# Patient Record
Sex: MALE | Race: WHITE | NOT HISPANIC OR LATINO | Employment: FULL TIME | URBAN - METROPOLITAN AREA
[De-identification: names, ages, dates, MRNs, and addresses within clinical notes are randomized per-mention and may not be internally consistent; named-entity substitution may affect disease eponyms.]

---

## 2015-01-14 LAB
EXTERNAL HIV SCREEN: NORMAL
HCV AB SER-ACNC: NEGATIVE

## 2020-12-08 ENCOUNTER — TELEPHONE (OUTPATIENT)
Dept: FAMILY MEDICINE CLINIC | Facility: CLINIC | Age: 45
End: 2020-12-08

## 2021-01-03 ENCOUNTER — HOSPITAL ENCOUNTER (EMERGENCY)
Facility: HOSPITAL | Age: 46
Discharge: HOME | End: 2021-01-03
Attending: EMERGENCY MEDICINE
Payer: COMMERCIAL

## 2021-01-03 VITALS
RESPIRATION RATE: 20 BRPM | SYSTOLIC BLOOD PRESSURE: 136 MMHG | TEMPERATURE: 97.7 F | DIASTOLIC BLOOD PRESSURE: 91 MMHG | WEIGHT: 230 LBS | HEART RATE: 78 BPM | OXYGEN SATURATION: 97 %

## 2021-01-03 DIAGNOSIS — N30.91 HEMORRHAGIC CYSTITIS: Primary | ICD-10-CM

## 2021-01-03 LAB
AMORPH URATE CRY URNS QL MICRO: ABNORMAL /HPF
ANION GAP SERPL CALCULATED.3IONS-SCNC: 9 MMOL/L (ref 4–13)
BACTERIA UR QL AUTO: ABNORMAL /HPF
BASOPHILS # BLD AUTO: 0.02 THOUSANDS/ΜL (ref 0–0.1)
BASOPHILS NFR BLD AUTO: 0 % (ref 0–1)
BILIRUB UR QL STRIP: ABNORMAL
BUN SERPL-MCNC: 16 MG/DL (ref 5–25)
CALCIUM SERPL-MCNC: 9.2 MG/DL (ref 8.3–10.1)
CHLORIDE SERPL-SCNC: 102 MMOL/L (ref 100–108)
CK MB SERPL-MCNC: 1.5 % (ref 0–2.5)
CK MB SERPL-MCNC: 2.4 NG/ML (ref 0–5)
CK SERPL-CCNC: 165 U/L (ref 39–308)
CLARITY UR: ABNORMAL
CO2 SERPL-SCNC: 27 MMOL/L (ref 21–32)
COLOR UR: ABNORMAL
CREAT SERPL-MCNC: 0.84 MG/DL (ref 0.6–1.3)
EOSINOPHIL # BLD AUTO: 0.09 THOUSAND/ΜL (ref 0–0.61)
EOSINOPHIL NFR BLD AUTO: 1 % (ref 0–6)
ERYTHROCYTE [DISTWIDTH] IN BLOOD BY AUTOMATED COUNT: 12.5 % (ref 11.6–15.1)
GFR SERPL CREATININE-BSD FRML MDRD: 105 ML/MIN/1.73SQ M
GLUCOSE SERPL-MCNC: 88 MG/DL (ref 65–140)
GLUCOSE UR STRIP-MCNC: NEGATIVE MG/DL
HCT VFR BLD AUTO: 42.3 % (ref 36.5–49.3)
HGB BLD-MCNC: 14.2 G/DL (ref 12–17)
HGB UR QL STRIP.AUTO: ABNORMAL
IMM GRANULOCYTES # BLD AUTO: 0.01 THOUSAND/UL (ref 0–0.2)
IMM GRANULOCYTES NFR BLD AUTO: 0 % (ref 0–2)
KETONES UR STRIP-MCNC: ABNORMAL MG/DL
LEUKOCYTE ESTERASE UR QL STRIP: ABNORMAL
LYMPHOCYTES # BLD AUTO: 2.43 THOUSANDS/ΜL (ref 0.6–4.47)
LYMPHOCYTES NFR BLD AUTO: 31 % (ref 14–44)
MCH RBC QN AUTO: 31.4 PG (ref 26.8–34.3)
MCHC RBC AUTO-ENTMCNC: 33.6 G/DL (ref 31.4–37.4)
MCV RBC AUTO: 94 FL (ref 82–98)
MONOCYTES # BLD AUTO: 0.5 THOUSAND/ΜL (ref 0.17–1.22)
MONOCYTES NFR BLD AUTO: 6 % (ref 4–12)
MUCOUS THREADS UR QL AUTO: ABNORMAL
NEUTROPHILS # BLD AUTO: 4.78 THOUSANDS/ΜL (ref 1.85–7.62)
NEUTS SEG NFR BLD AUTO: 62 % (ref 43–75)
NITRITE UR QL STRIP: POSITIVE
NON-SQ EPI CELLS URNS QL MICRO: ABNORMAL /HPF
PH UR STRIP.AUTO: 6.5 [PH]
PLATELET # BLD AUTO: 224 THOUSANDS/UL (ref 149–390)
PMV BLD AUTO: 9.2 FL (ref 8.9–12.7)
POTASSIUM SERPL-SCNC: 3.9 MMOL/L (ref 3.5–5.3)
PROT UR STRIP-MCNC: >=300 MG/DL
RBC # BLD AUTO: 4.52 MILLION/UL (ref 3.88–5.62)
RBC #/AREA URNS AUTO: ABNORMAL /HPF
SODIUM SERPL-SCNC: 138 MMOL/L (ref 136–145)
SP GR UR STRIP.AUTO: >=1.03 (ref 1–1.03)
UROBILINOGEN UR QL STRIP.AUTO: 2 E.U./DL
WBC # BLD AUTO: 7.83 THOUSAND/UL (ref 4.31–10.16)
WBC #/AREA URNS AUTO: ABNORMAL /HPF

## 2021-01-03 PROCEDURE — 82553 CREATINE MB FRACTION: CPT | Performed by: PHYSICIAN ASSISTANT

## 2021-01-03 PROCEDURE — 36415 COLL VENOUS BLD VENIPUNCTURE: CPT | Performed by: PHYSICIAN ASSISTANT

## 2021-01-03 PROCEDURE — 82550 ASSAY OF CK (CPK): CPT | Performed by: PHYSICIAN ASSISTANT

## 2021-01-03 PROCEDURE — 80048 BASIC METABOLIC PNL TOTAL CA: CPT | Performed by: PHYSICIAN ASSISTANT

## 2021-01-03 PROCEDURE — 99283 EMERGENCY DEPT VISIT LOW MDM: CPT

## 2021-01-03 PROCEDURE — 99284 EMERGENCY DEPT VISIT MOD MDM: CPT | Performed by: PHYSICIAN ASSISTANT

## 2021-01-03 PROCEDURE — 81001 URINALYSIS AUTO W/SCOPE: CPT | Performed by: PHYSICIAN ASSISTANT

## 2021-01-03 PROCEDURE — 85025 COMPLETE CBC W/AUTO DIFF WBC: CPT | Performed by: PHYSICIAN ASSISTANT

## 2021-01-03 RX ORDER — ESCITALOPRAM OXALATE 10 MG/1
10 TABLET ORAL DAILY
COMMUNITY
End: 2021-02-12 | Stop reason: SDUPTHER

## 2021-01-03 RX ORDER — CEPHALEXIN 500 MG/1
500 CAPSULE ORAL EVERY 8 HOURS SCHEDULED
Qty: 21 CAPSULE | Refills: 0 | Status: SHIPPED | OUTPATIENT
Start: 2021-01-03 | End: 2021-01-10

## 2021-01-03 NOTE — ED PROVIDER NOTES
History  Chief Complaint   Patient presents with    Blood in Urine     pt c/o blood in urine beginning earlier today, reports slight abdominal pain     53 yo male presents to the Emergency Department for evaluation of gross hematuria beginning today  Minimal dysuria, no urgency or frequency  Denies recent exertional activity or new medications  No concern for STI  No recent fevers, chills, sweats  No abd pain  No prior abd surgeries  Prior to Admission Medications   Prescriptions Last Dose Informant Patient Reported? Taking?   escitalopram (LEXAPRO) 10 mg tablet 1/2/2021 at Unknown time  Yes Yes   Sig: Take 10 mg by mouth daily      Facility-Administered Medications: None       History reviewed  No pertinent past medical history  History reviewed  No pertinent surgical history  History reviewed  No pertinent family history  I have reviewed and agree with the history as documented  E-Cigarette/Vaping     E-Cigarette/Vaping Substances     Social History     Tobacco Use    Smoking status: Never Smoker    Smokeless tobacco: Never Used   Substance Use Topics    Alcohol use: Not Currently    Drug use: Not Currently       Review of Systems   Constitutional: Negative for chills, diaphoresis and fever  Eyes: Negative for visual disturbance  Respiratory: Negative for cough and shortness of breath  Cardiovascular: Negative for chest pain and palpitations  Gastrointestinal: Negative for abdominal pain, diarrhea, nausea and vomiting  Genitourinary: Positive for hematuria  Negative for dysuria, flank pain, frequency, testicular pain and urgency  Musculoskeletal: Negative for arthralgias and myalgias  Skin: Negative for color change, rash and wound  Allergic/Immunologic: Negative for immunocompromised state  Neurological: Negative for dizziness and light-headedness  Hematological: Does not bruise/bleed easily  Psychiatric/Behavioral: Negative for confusion   The patient is not nervous/anxious  Physical Exam  Physical Exam  Vitals signs and nursing note reviewed  Constitutional:       Appearance: He is well-developed  HENT:      Head: Normocephalic and atraumatic  Mouth/Throat:      Mouth: Mucous membranes are moist    Eyes:      Conjunctiva/sclera: Conjunctivae normal    Neck:      Musculoskeletal: Neck supple  Cardiovascular:      Rate and Rhythm: Normal rate and regular rhythm  Heart sounds: No murmur  Pulmonary:      Effort: Pulmonary effort is normal  No respiratory distress  Breath sounds: Normal breath sounds  Abdominal:      Palpations: Abdomen is soft  Tenderness: There is no abdominal tenderness  Musculoskeletal:      Right lower leg: No edema  Left lower leg: No edema  Skin:     General: Skin is warm and dry  Capillary Refill: Capillary refill takes less than 2 seconds  Neurological:      Mental Status: He is alert     Psychiatric:         Mood and Affect: Mood normal          Behavior: Behavior normal          Vital Signs  ED Triage Vitals [01/03/21 1330]   Temperature Pulse Respirations Blood Pressure SpO2   97 7 °F (36 5 °C) 78 20 136/91 97 %      Temp Source Heart Rate Source Patient Position - Orthostatic VS BP Location FiO2 (%)   Tympanic Monitor Sitting Left arm --      Pain Score       --           Vitals:    01/03/21 1330   BP: 136/91   Pulse: 78   Patient Position - Orthostatic VS: Sitting         Visual Acuity      ED Medications  Medications - No data to display    Diagnostic Studies  Results Reviewed     Procedure Component Value Units Date/Time    Basic metabolic panel [[id number]] Collected: 01/03/21 1351    Lab Status: Final result Specimen: Blood from Arm, Right Updated: 01/03/21 1432     Sodium 138 mmol/L      Potassium 3 9 mmol/L      Chloride 102 mmol/L      CO2 27 mmol/L      ANION GAP 9 mmol/L      BUN 16 mg/dL      Creatinine 0 84 mg/dL      Glucose 88 mg/dL      Calcium 9 2 mg/dL      eGFR 105 ml/min/1 73sq m     Narrative:      National Kidney Disease Foundation guidelines for Chronic Kidney Disease (CKD):     Stage 1 with normal or high GFR (GFR > 90 mL/min/1 73 square meters)    Stage 2 Mild CKD (GFR = 60-89 mL/min/1 73 square meters)    Stage 3A Moderate CKD (GFR = 45-59 mL/min/1 73 square meters)    Stage 3B Moderate CKD (GFR = 30-44 mL/min/1 73 square meters)    Stage 4 Severe CKD (GFR = 15-29 mL/min/1 73 square meters)    Stage 5 End Stage CKD (GFR <15 mL/min/1 73 square meters)  Note: GFR calculation is accurate only with a steady state creatinine    CKMB [[id number]]  (Normal) Collected: 01/03/21 1351    Lab Status: Final result Specimen: Blood from Arm, Right Updated: 01/03/21 1432     CK-MB Index 1 5 %      CK-MB 2 4 ng/mL     CK Total with Reflex CKMB [[id number]]  (Normal) Collected: 01/03/21 1351    Lab Status: Final result Specimen: Blood from Arm, Right Updated: 01/03/21 1431     Total  U/L     Urine Microscopic [[id number]]  (Abnormal) Collected: 01/03/21 1344    Lab Status: Final result Specimen: Urine, Clean Catch Updated: 01/03/21 1430     RBC, UA Innumerable /hpf      WBC, UA 1-2 /hpf      Epithelial Cells None Seen /hpf      Bacteria, UA Innumerable /hpf      AMORPH URATES Moderate /hpf      MUCUS THREADS None Seen    CBC and differential [[id number]]  (Normal) Collected: 01/03/21 1351    Lab Status: Final result Specimen: Blood from Arm, Right Updated: 01/03/21 1356     WBC 7 83 Thousand/uL      RBC 4 52 Million/uL      Hemoglobin 14 2 g/dL      Hematocrit 42 3 %      MCV 94 fL      MCH 31 4 pg      MCHC 33 6 g/dL      RDW 12 5 %      MPV 9 2 fL      Platelets 220 Thousands/uL      Neutrophils Relative 62 %      Immat GRANS % 0 %      Lymphocytes Relative 31 %      Monocytes Relative 6 %      Eosinophils Relative 1 %      Basophils Relative 0 %      Neutrophils Absolute 4 78 Thousands/µL      Immature Grans Absolute 0 01 Thousand/uL      Lymphocytes Absolute 2 43 Thousands/µL      Monocytes Absolute 0 50 Thousand/µL      Eosinophils Absolute 0 09 Thousand/µL      Basophils Absolute 0 02 Thousands/µL     UA w Reflex to Microscopic w Reflex to Culture [[id number]]  (Abnormal) Collected: 01/03/21 1344    Lab Status: Final result Specimen: Urine, Clean Catch Updated: 01/03/21 1350     Color, UA Brown     Clarity, UA Cloudy     Specific Gravity, UA >=1 030     pH, UA 6 5     Leukocytes, UA Small     Nitrite, UA Positive     Protein, UA >=300 mg/dl      Glucose, UA Negative mg/dl      Ketones, UA Trace mg/dl      Urobilinogen, UA 2 0 E U /dl      Bilirubin, UA Interference- unable to analyze     Blood, UA Large                 No orders to display              Procedures  Procedures         ED Course                                           MDM  Number of Diagnoses or Management Options  Hemorrhagic cystitis: new and requires workup  Diagnosis management comments: UA c/w UTI, labs reassuring  No abd pain suggestive of stone disease  Will start PO abx, close return precautions discussed       Amount and/or Complexity of Data Reviewed  Clinical lab tests: ordered and reviewed  Review and summarize past medical records: yes  Independent visualization of images, tracings, or specimens: yes        Disposition  Final diagnoses:   Hemorrhagic cystitis     Time reflects when diagnosis was documented in both MDM as applicable and the Disposition within this note     Time User Action Codes Description Comment    1/3/2021  2:42 PM [place] Craft, [address] [[address]] Hemorrhagic cystitis       ED Disposition     ED Disposition Condition Date/Time Comment    Discharge Stable Sun Eliazar 3, 2021  2:42 PM Pritesh Lim discharge to home/self care              Follow-up Information     Follow up With Specialties Details Why Contact Info Additional Information     [address] Emergency Department Emergency Medicine  If symptoms worsen [address] [place], [address]  [phone]  ED, [address] [facility], [place] [address], [address]          Discharge Medication List as of 1/3/2021  2:42 PM      START taking these medications    Details   cephalexin (KEFLEX) 500 mg capsule Take 1 capsule (500 mg total) by mouth every 8 (eight) hours for 7 days, Starting Sun 1/3/2021, Until Sun 1/10/2021, Normal         CONTINUE these medications which have NOT CHANGED    Details   escitalopram (LEXAPRO) 10 mg tablet Take 10 mg by mouth daily, Historical Med           No discharge procedures on file      PDMP Review     None          ED Provider  Electronically Signed by           Marshall Estrada PA-C  01/03/21 7124

## 2021-01-23 ENCOUNTER — HOSPITAL ENCOUNTER (EMERGENCY)
Facility: HOSPITAL | Age: 46
Discharge: HOME | End: 2021-01-23
Attending: EMERGENCY MEDICINE | Admitting: EMERGENCY MEDICINE
Payer: COMMERCIAL

## 2021-01-23 ENCOUNTER — APPOINTMENT (EMERGENCY)
Dept: CT IMAGING | Facility: HOSPITAL | Age: 46
End: 2021-01-23
Payer: COMMERCIAL

## 2021-01-23 VITALS
DIASTOLIC BLOOD PRESSURE: 89 MMHG | RESPIRATION RATE: 16 BRPM | OXYGEN SATURATION: 96 % | BODY MASS INDEX: 32.93 KG/M2 | TEMPERATURE: 98.2 F | WEIGHT: 230 LBS | HEIGHT: 70 IN | HEART RATE: 85 BPM | SYSTOLIC BLOOD PRESSURE: 148 MMHG

## 2021-01-23 DIAGNOSIS — R31.9 HEMATURIA: Primary | ICD-10-CM

## 2021-01-23 DIAGNOSIS — N20.0 NEPHROLITHIASIS: ICD-10-CM

## 2021-01-23 DIAGNOSIS — N20.1 RIGHT URETERAL CALCULUS: ICD-10-CM

## 2021-01-23 LAB
ALBUMIN SERPL BCP-MCNC: 4 G/DL (ref 3.5–5)
ALP SERPL-CCNC: 113 U/L (ref 46–116)
ALT SERPL W P-5'-P-CCNC: 32 U/L (ref 12–78)
ANION GAP SERPL CALCULATED.3IONS-SCNC: 7 MMOL/L (ref 4–13)
AST SERPL W P-5'-P-CCNC: 16 U/L (ref 5–45)
BACTERIA UR QL AUTO: ABNORMAL /HPF
BASOPHILS # BLD AUTO: 0.03 THOUSANDS/ΜL (ref 0–0.1)
BASOPHILS NFR BLD AUTO: 0 % (ref 0–1)
BILIRUB SERPL-MCNC: 0.52 MG/DL (ref 0.2–1)
BILIRUB UR QL STRIP: NEGATIVE
BUN SERPL-MCNC: 16 MG/DL (ref 5–25)
CALCIUM SERPL-MCNC: 9.2 MG/DL (ref 8.3–10.1)
CHLORIDE SERPL-SCNC: 103 MMOL/L (ref 100–108)
CLARITY UR: ABNORMAL
CO2 SERPL-SCNC: 28 MMOL/L (ref 21–32)
COLOR UR: ABNORMAL
CREAT SERPL-MCNC: 0.99 MG/DL (ref 0.6–1.3)
EOSINOPHIL # BLD AUTO: 0.1 THOUSAND/ΜL (ref 0–0.61)
EOSINOPHIL NFR BLD AUTO: 1 % (ref 0–6)
ERYTHROCYTE [DISTWIDTH] IN BLOOD BY AUTOMATED COUNT: 12.2 % (ref 11.6–15.1)
GFR SERPL CREATININE-BSD FRML MDRD: 91 ML/MIN/1.73SQ M
GLUCOSE SERPL-MCNC: 99 MG/DL (ref 65–140)
GLUCOSE UR STRIP-MCNC: NEGATIVE MG/DL
HCT VFR BLD AUTO: 41 % (ref 36.5–49.3)
HGB BLD-MCNC: 14.3 G/DL (ref 12–17)
HGB UR QL STRIP.AUTO: ABNORMAL
IMM GRANULOCYTES # BLD AUTO: 0.02 THOUSAND/UL (ref 0–0.2)
IMM GRANULOCYTES NFR BLD AUTO: 0 % (ref 0–2)
KETONES UR STRIP-MCNC: NEGATIVE MG/DL
LEUKOCYTE ESTERASE UR QL STRIP: NEGATIVE
LYMPHOCYTES # BLD AUTO: 2.23 THOUSANDS/ΜL (ref 0.6–4.47)
LYMPHOCYTES NFR BLD AUTO: 28 % (ref 14–44)
MCH RBC QN AUTO: 32.4 PG (ref 26.8–34.3)
MCHC RBC AUTO-ENTMCNC: 34.9 G/DL (ref 31.4–37.4)
MCV RBC AUTO: 93 FL (ref 82–98)
MONOCYTES # BLD AUTO: 0.41 THOUSAND/ΜL (ref 0.17–1.22)
MONOCYTES NFR BLD AUTO: 5 % (ref 4–12)
NEUTROPHILS # BLD AUTO: 5.32 THOUSANDS/ΜL (ref 1.85–7.62)
NEUTS SEG NFR BLD AUTO: 66 % (ref 43–75)
NITRITE UR QL STRIP: NEGATIVE
NON-SQ EPI CELLS URNS QL MICRO: ABNORMAL /HPF
NRBC BLD AUTO-RTO: 0 /100 WBCS
PH UR STRIP.AUTO: 7 [PH]
PLATELET # BLD AUTO: 236 THOUSANDS/UL (ref 149–390)
PMV BLD AUTO: 9.5 FL (ref 8.9–12.7)
POTASSIUM SERPL-SCNC: 4 MMOL/L (ref 3.5–5.3)
PROT SERPL-MCNC: 7.6 G/DL (ref 6.4–8.2)
PROT UR STRIP-MCNC: NEGATIVE MG/DL
RBC # BLD AUTO: 4.41 MILLION/UL (ref 3.88–5.62)
RBC #/AREA URNS AUTO: ABNORMAL /HPF
SODIUM SERPL-SCNC: 138 MMOL/L (ref 136–145)
SP GR UR STRIP.AUTO: 1.02 (ref 1–1.03)
UROBILINOGEN UR QL STRIP.AUTO: 0.2 E.U./DL
WBC # BLD AUTO: 8.11 THOUSAND/UL (ref 4.31–10.16)
WBC #/AREA URNS AUTO: ABNORMAL /HPF

## 2021-01-23 PROCEDURE — 99284 EMERGENCY DEPT VISIT MOD MDM: CPT

## 2021-01-23 PROCEDURE — 36415 COLL VENOUS BLD VENIPUNCTURE: CPT | Performed by: PHYSICIAN ASSISTANT

## 2021-01-23 PROCEDURE — 80053 COMPREHEN METABOLIC PANEL: CPT | Performed by: PHYSICIAN ASSISTANT

## 2021-01-23 PROCEDURE — G1004 CDSM NDSC: HCPCS

## 2021-01-23 PROCEDURE — 85025 COMPLETE CBC W/AUTO DIFF WBC: CPT | Performed by: PHYSICIAN ASSISTANT

## 2021-01-23 PROCEDURE — 81001 URINALYSIS AUTO W/SCOPE: CPT | Performed by: PHYSICIAN ASSISTANT

## 2021-01-23 PROCEDURE — 74177 CT ABD & PELVIS W/CONTRAST: CPT

## 2021-01-23 PROCEDURE — 87086 URINE CULTURE/COLONY COUNT: CPT | Performed by: PHYSICIAN ASSISTANT

## 2021-01-23 PROCEDURE — 99284 EMERGENCY DEPT VISIT MOD MDM: CPT | Performed by: PHYSICIAN ASSISTANT

## 2021-01-23 RX ORDER — IBUPROFEN 600 MG/1
600 TABLET ORAL EVERY 6 HOURS PRN
Qty: 20 TABLET | Refills: 0 | Status: SHIPPED | OUTPATIENT
Start: 2021-01-23 | End: 2021-12-03

## 2021-01-23 RX ORDER — ONDANSETRON 4 MG/1
4 TABLET, ORALLY DISINTEGRATING ORAL EVERY 8 HOURS PRN
Qty: 20 TABLET | Refills: 0 | Status: SHIPPED | OUTPATIENT
Start: 2021-01-23 | End: 2021-02-05 | Stop reason: ALTCHOICE

## 2021-01-23 RX ORDER — TAMSULOSIN HYDROCHLORIDE 0.4 MG/1
0.4 CAPSULE ORAL DAILY
Qty: 10 CAPSULE | Refills: 0 | Status: SHIPPED | OUTPATIENT
Start: 2021-01-23 | End: 2021-02-05 | Stop reason: ALTCHOICE

## 2021-01-23 RX ADMIN — IOHEXOL 100 ML: 350 INJECTION, SOLUTION INTRAVENOUS at 18:47

## 2021-01-23 NOTE — ED PROVIDER NOTES
History  Chief Complaint   Patient presents with    Blood in Urine     Hematuria, onset on Jan 3rd, was treated for UTI, but hematuria is continuing  Reports pain in "bladder area"  Alia Bernstein is a 54 y o  male who presents to the ED with complaints of recurrent hematuria  Patient was seen in the ED and placed on Keflex on 01/03/2021 for suspected UTI which improved symptoms but he noticed a return of hematuria a few days later  Patient states he has noticed BRB, urinary frequency and bladder pain after urinating  Patient states he had been told in the past he has microscopic blood in his urine  Patient states a few years ago he had right sided flank pain and passed "sand" which he thought may be a kidney stone but was never formally evaluated  Patient does vape  Denies dysuria, penile discharge, penile pain, testicular pain, testicular swelling, abdominal pain, nausea, vomiting, diarrhea, chest pain, SOB, fever, chills  History provided by:  Patient  Blood in Urine  This is a recurrent problem  He describes the hematuria as gross hematuria  The hematuria occurs throughout his entire urinary stream  He describes his urine color as light pink  Irritative symptoms include frequency  Irritative symptoms do not include nocturia or urgency  Pertinent negatives include no abdominal pain, chills, dysuria, facial swelling, fever, flank pain, genital pain, hesitancy, inability to urinate, nausea or vomiting  Prior to Admission Medications   Prescriptions Last Dose Informant Patient Reported? Taking?   escitalopram (LEXAPRO) 10 mg tablet   Yes No   Sig: Take 10 mg by mouth daily      Facility-Administered Medications: None       History reviewed  No pertinent past medical history  History reviewed  No pertinent surgical history  History reviewed  No pertinent family history  I have reviewed and agree with the history as documented      E-Cigarette/Vaping    E-Cigarette Use Current Every Day User E-Cigarette/Vaping Substances     Social History     Tobacco Use    Smoking status: Never Smoker    Smokeless tobacco: Never Used   Substance Use Topics    Alcohol use: Not Currently    Drug use: Not Currently       Review of Systems   Constitutional: Negative for appetite change, chills, fever and unexpected weight change  HENT: Negative for congestion, drooling, ear pain, facial swelling, rhinorrhea, sore throat, trouble swallowing and voice change  Eyes: Negative for pain, discharge, redness and visual disturbance  Respiratory: Negative for cough, shortness of breath, wheezing and stridor  Cardiovascular: Negative for chest pain, palpitations and leg swelling  Gastrointestinal: Negative for abdominal pain, blood in stool, constipation, diarrhea, nausea and vomiting  Genitourinary: Positive for frequency and hematuria  Negative for dysuria, enuresis, flank pain, genital sores, hesitancy, nocturia and urgency  Musculoskeletal: Negative for gait problem, joint swelling, neck pain and neck stiffness  Skin: Negative for color change and rash  Neurological: Negative for dizziness, seizures, light-headedness and headaches  Physical Exam  Physical Exam  Vitals signs and nursing note reviewed  Constitutional:       Appearance: He is well-developed  HENT:      Head: Normocephalic and atraumatic  Nose: Nose normal    Eyes:      Conjunctiva/sclera: Conjunctivae normal       Pupils: Pupils are equal, round, and reactive to light  Neck:      Musculoskeletal: Normal range of motion and neck supple  Cardiovascular:      Rate and Rhythm: Normal rate and regular rhythm  Pulmonary:      Effort: Pulmonary effort is normal       Breath sounds: Normal breath sounds  Abdominal:      General: Abdomen is flat  Bowel sounds are normal       Tenderness: There is no abdominal tenderness  There is no right CVA tenderness or left CVA tenderness  Musculoskeletal: Normal range of motion  Skin:     General: Skin is warm and dry  Capillary Refill: Capillary refill takes less than 2 seconds  Neurological:      Mental Status: He is alert and oriented to person, place, and time  Vital Signs  ED Triage Vitals [01/23/21 1736]   Temperature Pulse Respirations Blood Pressure SpO2   98 2 °F (36 8 °C) 85 16 148/89 96 %      Temp Source Heart Rate Source Patient Position - Orthostatic VS BP Location FiO2 (%)   Temporal -- -- -- --      Pain Score       2           Vitals:    01/23/21 1736   BP: 148/89   Pulse: 85         Visual Acuity      ED Medications  Medications   iohexol (OMNIPAQUE) 350 MG/ML injection (MULTI-DOSE) 100 mL (100 mL Intravenous Given 1/23/21 1847)       Diagnostic Studies  Results Reviewed     Procedure Component Value Units Date/Time    Urine Microscopic [[id number]]  (Abnormal) Collected: 01/23/21 1803    Lab Status: Final result Specimen: Urine, Clean Catch Updated: 01/23/21 1853     RBC, UA Innumerable /hpf      WBC, UA       Field obscured, unable to enumerate     /hpf     Epithelial Cells       Field obscured, unable to enumerate     /hpf     Bacteria, UA       Field obscured, unable to enumerate     /hpf    Urine culture [[id number]] Collected: 01/23/21 1803    Lab Status:  In process Specimen: Urine, Clean Catch Updated: 01/23/21 1853    Comprehensive metabolic panel [[id number]] Collected: 01/23/21 1803    Lab Status: Final result Specimen: Blood from Arm, Right Updated: 01/23/21 1830     Sodium 138 mmol/L      Potassium 4 0 mmol/L      Chloride 103 mmol/L      CO2 28 mmol/L      ANION GAP 7 mmol/L      BUN 16 mg/dL      Creatinine 0 99 mg/dL      Glucose 99 mg/dL      Calcium 9 2 mg/dL      AST 16 U/L      ALT 32 U/L      Alkaline Phosphatase 113 U/L      Total Protein 7 6 g/dL      Albumin 4 0 g/dL      Total Bilirubin 0 52 mg/dL      eGFR 91 ml/min/1 73sq m     Narrative:      [employer] guidelines for Chronic Kidney Disease (CKD):     Stage 1 with normal or high GFR (GFR > 90 mL/min/1 73 square meters)    Stage 2 Mild CKD (GFR = 60-89 mL/min/1 73 square meters)    Stage 3A Moderate CKD (GFR = 45-59 mL/min/1 73 square meters)    Stage 3B Moderate CKD (GFR = 30-44 mL/min/1 73 square meters)    Stage 4 Severe CKD (GFR = 15-29 mL/min/1 73 square meters)    Stage 5 End Stage CKD (GFR <15 mL/min/1 73 square meters)  Note: GFR calculation is accurate only with a steady state creatinine    UA w Reflex to Microscopic w Reflex to Culture [[id number]]  (Abnormal) Collected: 01/23/21 1803    Lab Status: Final result Specimen: Urine, Clean Catch Updated: 01/23/21 1816     Color, UA Red     Clarity, UA Cloudy     Specific Gravity, UA 1 025     pH, UA 7 0     Leukocytes, UA Negative     Nitrite, UA Negative     Protein, UA Negative mg/dl      Glucose, UA Negative mg/dl      Ketones, UA Negative mg/dl      Urobilinogen, UA 0 2 E U /dl      Bilirubin, UA Negative     Blood, UA Moderate    CBC and differential [[id number]] Collected: 01/23/21 1803    Lab Status: Final result Specimen: Blood from Arm, Right Updated: 01/23/21 1812     WBC 8 11 Thousand/uL      RBC 4 41 Million/uL      Hemoglobin 14 3 g/dL      Hematocrit 41 0 %      MCV 93 fL      MCH 32 4 pg      MCHC 34 9 g/dL      RDW 12 2 %      MPV 9 5 fL      Platelets 994 Thousands/uL      nRBC 0 /100 WBCs      Neutrophils Relative 66 %      Immat GRANS % 0 %      Lymphocytes Relative 28 %      Monocytes Relative 5 %      Eosinophils Relative 1 %      Basophils Relative 0 %      Neutrophils Absolute 5 32 Thousands/µL      Immature Grans Absolute 0 02 Thousand/uL      Lymphocytes Absolute 2 23 Thousands/µL      Monocytes Absolute 0 41 Thousand/µL      Eosinophils Absolute 0 10 Thousand/µL      Basophils Absolute 0 03 Thousands/µL                  CT abdomen pelvis with contrast   Final Result by Carmella Pro MD (01/23 1925)      1    Minimal right hydroureteronephrosis secondary to a 3-4 mm distal right ureteral calculus  2   11 mm calculus at the left ureteropelvic junction with slight fullness of the lower pole portion of the collecting system  3   3 mm nonobstructing left renal calculus  4   Diverticulosis coli  The study was marked in [facility] for immediate notification  Workstation performed: [id number]                    Procedures  Procedures         ED Course  ED Course as of Jan 23 2028   Sat Jan 23, 2021   1932 Educated patient regarding diagnosis and management  Advised patient to follow up with PCP  Advised patient to RTER for persistent or worsening symptoms  MDM  Number of Diagnoses or Management Options  Hematuria: new and requires workup  Nephrolithiasis: new and requires workup  Right ureteral calculus: new and requires workup  Diagnosis management comments: UA with large blood  Labs WNL  CT Abdomen and Pelvis w/ contrast significant for minimal right hydroureteronephrosis secondary to a 3-4 mm distal right ureteral calculus  11 mm calculus at the left ureteropelvic junction with slight fullness of the lower pole portion of the collecting system  3 mm nonobstructing left renal calculus  Diverticulosis coli  On repeat examination, patient does report mild right flank/LQ pain  Patient states he would like to try medical expulsive therapy and follow up with outpatient urology  Patient has a history of drug addition and would like to avoid opioid medications  Patient was instructed to strain urine  Patient was advised to go to the [facility] ED in the case of worsening pain, nausea/vomiting, inability to tolerate oral intake, inability to urinate, urinary retention or worsening signs of toxicity/infection given lack of urology coverage at [facility] on the weekends  Patient does have a history of tobacco use and was advised to follow up with urology for cystoscopy for r/o malignancy as well  I provided patient with strict RTER precautions   I advised patient follow-up with PCP in 24-48 hours  Patient verbalized understanding  Amount and/or Complexity of Data Reviewed  Clinical lab tests: reviewed and ordered  Tests in the radiology section of CPT®: ordered and reviewed  Review and summarize past medical records: yes    Patient Progress  Patient progress: stable      Disposition  Final diagnoses:   Hematuria   Nephrolithiasis   Right ureteral calculus     Time reflects when diagnosis was documented in both MDM as applicable and the Disposition within this note     Time User Action Codes Description Comment    1/23/2021  7:22 PM Ramond Balsam [R31 9] Hematuria     1/23/2021  7:30 PM Ramond Balsam [N20 0] Nephrolithiasis     1/23/2021  7:32 PM Domi De Pazez Add [N20 1] Right ureteral calculus       ED Disposition     ED Disposition Condition Date/Time Comment    Discharge Stable Sat Jan 23, 2021  7:30 PM Julianna Easley discharge to home/self care              Follow-up Information     Follow up With Specialties Details Why Contact Info Additional [address] Emergency Department Emergency Medicine Go to  If symptoms worsen [address] [facility] [address] [facility] Emergency Department, [address], [place], [place], [address] [facility] Schedule an appointment as soon as possible for a visit   [address] [phone]  [address]  [facility]rology [facility] [address] [place], [place], [address] [facility]          Discharge Medication List as of 1/23/2021  7:32 PM      START taking these medications    Details   ibuprofen (MOTRIN) 600 mg tablet Take 1 tablet (600 mg total) by mouth every 6 (six) hours as needed for mild pain or moderate pain, Starting Sat 1/23/2021, Normal      ondansetron (ZOFRAN-ODT) 4 mg disintegrating tablet Take 1 tablet (4 mg total) by mouth every 8 (eight) hours as needed for nausea or vomiting, Starting Sat 1/23/2021, Normal      tamsulosin (FLOMAX) 0 4 mg Take 1 capsule (0 4 mg total) by mouth daily Take once daily until stone passes, Starting Sat 1/23/2021, Normal         CONTINUE these medications which have NOT CHANGED    Details   escitalopram (LEXAPRO) 10 mg tablet Take 10 mg by mouth daily, Historical Med           No discharge procedures on file      PDMP Review     None          ED Provider  Electronically Signed by           Naa Estrada PA-C  01/23/21 2028

## 2021-01-24 NOTE — DISCHARGE INSTRUCTIONS
Kidney Stones   WHAT YOU NEED TO KNOW:   Kidney stones form in the urinary system when the water and waste in your urine are out of balance  When this happens, certain types of waste crystals separate from the urine  The crystals build up and form kidney stones  You may have more than one kidney stone  DISCHARGE INSTRUCTIONS:   Return to the emergency department if:   · You have vomiting that is not relieved by medicine  Contact your healthcare provider if:   · You have a fever  · You have trouble passing urine  · You see blood in your urine  · You have severe pain  · You have any questions or concerns about your condition or care  Medicines:   · NSAIDs , such as ibuprofen, help decrease swelling, pain, and fever  This medicine is available with or without a doctor's order  NSAIDs can cause stomach bleeding or kidney problems in certain people  If you take blood thinner medicine, always ask your healthcare provider if NSAIDs are safe for you  Always read the medicine label and follow directions  · Prescription pain medicine  may be given  Ask your healthcare provider how to take this medicine safely  Some prescription pain medicines contain acetaminophen  Do not take other medicines that contain acetaminophen without talking to your healthcare provider  Too much acetaminophen may cause liver damage  Prescription pain medicine may cause constipation  Ask your healthcare provider how to prevent or treat constipation  · Medicines  to balance your electrolytes may be needed  · Take your medicine as directed  Contact your healthcare provider if you think your medicine is not helping or if you have side effects  Tell him or her if you are allergic to any medicine  Keep a list of the medicines, vitamins, and herbs you take  Include the amounts, and when and why you take them  Bring the list or the pill bottles to follow-up visits   Carry your medicine list with you in case of an emergency  Follow up with your healthcare provider as directed: You may need to return for more tests  Write down your questions so you remember to ask them during your visits  What you can do to manage kidney stones:   · Drink more liquids  Your healthcare provider may tell you to drink at least 8 to 12 (eight-ounce) cups of liquids each day  This helps flush out the kidney stones when you urinate  Water is the best liquid to drink  · Strain your urine every time you go to the bathroom  Urinate through a strainer or a piece of thin cloth to catch the stones  Take the stones to your healthcare provider so they can be sent to the lab for tests  This will help your healthcare providers plan the best treatment for you  · Eat a variety of healthy foods  Healthy foods include fruits, vegetables, whole-grain breads, low-fat dairy products, beans, and fish  You may need to limit how much sodium (salt) or protein you eat  Ask for information about the best foods for you  · Stay active  Your stones may pass more easily if you stay active  Exercise can also help you manage your weight  Ask about the best activities for you  After you pass the kidney stones: Your healthcare provider may  order a 24-hour urine test  Results from a 24-hour urine test will help your healthcare provider plan ways to prevent more stones from forming  Your healthcare provider will give you more instructions  © Copyright [address] Information is for End User's use only and may not be sold, redistributed or otherwise used for commercial purposes  All illustrations and images included in CareNotes® are the copyrighted property of A D A M , Inc  or [address] [place] [address]  The above information is an  only  It is not intended as medical advice for individual conditions or treatments   Talk to your doctor, nurse or pharmacist before following any medical regimen to see if it is safe and effective for you

## 2021-01-25 LAB — BACTERIA UR CULT: NORMAL

## 2021-01-29 ENCOUNTER — TELEPHONE (OUTPATIENT)
Dept: UROLOGY | Facility: AMBULATORY SURGERY CENTER | Age: 46
End: 2021-01-29

## 2021-01-29 NOTE — TELEPHONE ENCOUNTER
Patient with multiple stones causing hematuria according to er notes  CT reveals:  1  Minimal right hydroureteronephrosis secondary to a 3-4 mm distal right ureteral calculus      2   11 mm calculus at the left ureteropelvic junction with slight fullness of the lower pole portion of the collecting system      3   3 mm nonobstructing left renal calculus  Called and spoke with patient  Offered Monday 2/1 at 0730 or 0830  Patient would like Thursday or Friday, I reviewed our first available Friday opening is not till 3/12  Patient ultimately accepted 2/1 at 0730 at the 8th e office

## 2021-01-29 NOTE — TELEPHONE ENCOUNTER
Patient called the office today as a hospital follow up, hoping to schedule an appointment with us for kidney stones  He was found to have stones a week ago and has yet to pass them (and is almost done with his pain medication  He would like to get into the [facility] office [facility] second choice) ASAP, but does have numerous specifications with his schedule, so I did make him aware that if we only go by those specifications, we may not be able to get him in as soon as he would like  The only day that he doesn't work is Friday, but he would be able to come in on a Thursday as a last resort  Please triage results and see what the appropriate time frame is to get this patient in  Thank you!

## 2021-02-01 ENCOUNTER — TELEMEDICINE (OUTPATIENT)
Dept: UROLOGY | Facility: AMBULATORY SURGERY CENTER | Age: 46
End: 2021-02-01
Payer: COMMERCIAL

## 2021-02-01 DIAGNOSIS — N20.0 NEPHROLITHIASIS: Primary | ICD-10-CM

## 2021-02-01 PROCEDURE — 99203 OFFICE O/P NEW LOW 30 MIN: CPT | Performed by: NURSE PRACTITIONER

## 2021-02-01 RX ORDER — CEFAZOLIN SODIUM 2 G/50ML
2000 SOLUTION INTRAVENOUS ONCE
Status: CANCELLED | OUTPATIENT
Start: 2021-02-01 | End: 2021-02-01

## 2021-02-01 NOTE — PROGRESS NOTES
Virtual Regular Visit      Assessment/Plan:    Problem List Items Addressed This Visit        Genitourinary    Nephrolithiasis - Primary       We reviewed the results of his recent CT scan which was performed with IV contrast in the emergency department on 01/23/2021  Findings identified a 3-4 mm right distal ureteral calculus with mild hydroureteronephrosis as well as a 1 1 cm left UPJ calculus  Patient continues to experience occasional episodes of gross hematuria, bilateral flank pain, and nausea  He did retrieve a tiny stone which is likely the right distal ureteral calculus  Patient will save specimen for stone analysis  Due to his persistent symptoms, we discussed proceeding with surgical intervention for additional 1 1 cm left UPJ calculus  Informed consent was provided including risks and benefits  Discussed with patient the possibility of staged procedure  Case requested for left-sided ureteroscopy, holmium laser lithotripsy, ureteral stent placement, and retrograde pyelogram, possible fast track surgery  Surgery coordinator will be in contact with patient to arrange  Recent urine culture was negative for infection  In the interim, encouraged patient to increase his water intake in use OTC NSAIDs as needed  Reviewed ER precautions                      Reason for visit is   Chief Complaint   Patient presents with    Virtual Regular Visit        Encounter provider SHAYLA Madison    Provider located at [address] [address]      Recent Visits  Date Type Provider Dept   01/29/21 Telephone Sumanth Tucker [facility]   Showing recent visits within past 7 days and meeting all other requirements     Today's Visits  Date Type Provider Dept   02/01/21 [id number] Myesha Mace, [address] [facility]   Showing today's visits and meeting all other requirements     Future Appointments  No visits were found meeting these conditions  Showing future appointments within next 150 days and meeting all other requirements    It was my intent to perform this visit via video technology but the patient was not able to do a video connection so the visit was completed via audio telephone only  The patient was identified by name and date of birth  Alyssa Mandujano was informed that this is a telemedicine visit and that the visit is being conducted through telephone  My office door was closed  No one else was in the room  He acknowledged consent and understanding of privacy and security of the video platform  The patient has agreed to participate and understands they can discontinue the visit at any time  Patient is aware this is a billable service  Subjective  Alyssa Mandujano is a 54 y o  male who is being evaluated in consultation  Patient recently experienced an episode of gross hematuria and bilateral flank pain  He initially presented to the emergency department on 01/03/2021  Patient was discharged home, but re-presented to the ER on 01/23/2021 with ongoing gross hematuria and flank pain  Patient states pain was primarily on the right side  He also notes mild dysuria  He denies any additional lower urinary tract symptoms, fever, chills, nausea, or vomiting  CT abdomen pelvis identified a 3-4 mm right distal ureteral calculus with mild hydroureteronephrosis as well as a 1 1 cm calculus at the left UPJ  WBC 8 11, creatinine 0 99, and urine appear negative for infection  Patient states a few days later he spontaneously passed a tiny stone which she captured  He believes he had a previous stone episode several years ago  He has never required surgical intervention  Patient denies any additional urologic history  No past medical history on file  No past surgical history on file      Current Outpatient Medications   Medication Sig Dispense Refill    escitalopram (LEXAPRO) 10 mg tablet Take 10 mg by mouth daily      ibuprofen (MOTRIN) 600 mg tablet Take 1 tablet (600 mg total) by mouth every 6 (six) hours as needed for mild pain or moderate pain 20 tablet 0    ondansetron (ZOFRAN-ODT) 4 mg disintegrating tablet Take 1 tablet (4 mg total) by mouth every 8 (eight) hours as needed for nausea or vomiting 20 tablet 0    tamsulosin (FLOMAX) 0 4 mg Take 1 capsule (0 4 mg total) by mouth daily Take once daily until stone passes 10 capsule 0     No current facility-administered medications for this visit  No Known Allergies    Review of Systems   Constitutional: Negative  HENT: Negative  Respiratory: Negative  Cardiovascular: Negative  Gastrointestinal: Negative  Genitourinary: Positive for flank pain and hematuria  Negative for decreased urine volume, difficulty urinating, dysuria, frequency and urgency  Skin: Negative  Neurological: Negative  Psychiatric/Behavioral: Negative  Video Exam    There were no vitals filed for this visit  Physical Exam     I spent 15 minutes with patient today in which greater than 50% of the time was spent in counseling/coordination of care regarding plan of care      VIRTUAL VISIT DISCLAIMER    Yulisa Gonsalez acknowledges that he has consented to an online visit or consultation  He understands that the online visit is based solely on information provided by him, and that, in the absence of a face-to-face physical evaluation by the physician, the diagnosis he receives is both limited and provisional in terms of accuracy and completeness  This is not intended to replace a full medical face-to-face evaluation by the physician  Yulisa Gonsalez understands and accepts these terms

## 2021-02-01 NOTE — ASSESSMENT & PLAN NOTE
We reviewed the results of his recent CT scan which was performed with IV contrast in the emergency department on 01/23/2021  Findings identified a 3-4 mm right distal ureteral calculus with mild hydroureteronephrosis as well as a 1 1 cm left UPJ calculus  Patient continues to experience occasional episodes of gross hematuria, bilateral flank pain, and nausea  He did retrieve a tiny stone which is likely the right distal ureteral calculus  Patient will save specimen for stone analysis  Due to his persistent symptoms, we discussed proceeding with surgical intervention for additional 1 1 cm left UPJ calculus  Informed consent was provided including risks and benefits  Discussed with patient the possibility of staged procedure  Case requested for left-sided ureteroscopy, holmium laser lithotripsy, ureteral stent placement, and retrograde pyelogram, possible fast track surgery  Surgery coordinator will be in contact with patient to arrange  Recent urine culture was negative for infection  In the interim, encouraged patient to increase his water intake in use OTC NSAIDs as needed  Reviewed ER precautions

## 2021-02-02 ENCOUNTER — TELEPHONE (OUTPATIENT)
Dept: UROLOGY | Facility: MEDICAL CENTER | Age: 46
End: 2021-02-02

## 2021-02-02 NOTE — TELEPHONE ENCOUNTER
I spoke to patient and scheduled him for a fast track stone case with Dr Simone Quezada on 2/11 at [facility]  I verbally went over his instructions  He is aware that the hospital will call him the day prior with an arrival time

## 2021-02-04 PROBLEM — E66.9 OBESITY: Status: ACTIVE | Noted: 2021-02-04

## 2021-02-04 PROBLEM — F19.11 HISTORY OF DRUG ABUSE (HCC): Status: ACTIVE | Noted: 2021-02-04

## 2021-02-04 PROBLEM — F10.11 HISTORY OF ALCOHOL ABUSE: Status: ACTIVE | Noted: 2021-02-04

## 2021-02-04 PROBLEM — R74.8 ELEVATED LIVER ENZYMES: Status: ACTIVE | Noted: 2021-02-04

## 2021-02-04 PROBLEM — F41.9 ANXIETY: Status: ACTIVE | Noted: 2017-05-16

## 2021-02-04 PROBLEM — E78.1 HYPERTRIGLYCERIDEMIA: Status: ACTIVE | Noted: 2021-02-04

## 2021-02-04 PROBLEM — R73.01 IFG (IMPAIRED FASTING GLUCOSE): Status: ACTIVE | Noted: 2017-06-17

## 2021-02-04 PROBLEM — E55.9 VITAMIN D DEFICIENCY: Status: ACTIVE | Noted: 2017-05-16

## 2021-02-04 NOTE — PROGRESS NOTES
Assessment/Plan:     1  Well adult exam    2  Palpitations  Suspect these were secondary to stone pain/infection - have not occurred in over a week  EKG today NSR  If they recur will order echo and Holter  - POCT ECG  - Lipid panel; Future  - TSH, 3rd generation with Free T4 reflex; Future    3  Hypertriglyceridemia  Update labs   - Lipid panel; Future  - TSH, 3rd generation with Free T4 reflex; Future    Class 1 obesity due to excess calories without serious comorbidity with body mass index (BMI) of 33 0 to 33 9 in adult  Encouraged diet and exercise to help for a healthier BMI  Well adult exam  ·         Continue healthy diet   ·         Encourage exercise 4 times a week or more for minimum 30 minutes  ·         Continue to see dentist, wear seatbelt  ·         Health maintenance reviewed and up-to-date  Update lipids  Reviewed age appropriate health maintenance screenings and immunizations that are due, risks and benefits of these  Health Maintenance   Topic Date Due    HIV Screening  05/29/1989    BMI: Followup Plan  05/29/1992    Annual Physical  05/29/1992    BMI: Adult  02/05/2022    Depression Remission PHQ  02/05/2022    DTaP,Tdap,and Td Vaccines (3 - Td) 01/14/2025    Influenza Vaccine  Completed    Pneumococcal Vaccine: Pediatrics (0 to 5 Years) and At-Risk Patients (6 to 59 Years)  Aged Out    HIB Vaccine  Aged Out    Hepatitis B Vaccine  Aged Out    IPV Vaccine  Aged Out    Hepatitis A Vaccine  Aged Out    Meningococcal ACWY Vaccine  Aged Out    HPV Vaccine  Aged Out     Return in about 6 months (around 8/5/2021) for CC       Subjective:    MIRTA Maloney is a 54 y o  male who presents today for a physical      Chief Complaint   Patient presents with    Physical Exam     PHQ-9 Depression Screening    PHQ-9:   Frequency of the following problems over the past two weeks:      Little interest or pleasure in doing things: 0 - not at all  Feeling down, depressed, or hopeless: 0 - not at all  Trouble falling or staying asleep, or sleeping too much: 0 - not at all  Feeling tired or having little energy: 0 - not at all  Poor appetite or overeatin - not at all  Feeling bad about yourself - or that you are a failure or have let yourself or your family down: 0 - not at all  Trouble concentrating on things, such as reading the newspaper or watching television: 0 - not at all  Moving or speaking so slowly that other people could have noticed  Or the opposite - being so fidgety or restless that you have been moving around a lot more than usual: 0 - not at all  Thoughts that you would be better off dead, or of hurting yourself in some way: 0 - not at all  PHQ-2 Score: 0  PHQ-9 Score: 0        ---Above per clinical staff & reviewed  ---  Patient here today for a physical:    Diet: low carb diet - had lost weight - gained back after vacation - now lost most again - was down to 218   Exercise:  Running   Dental visits:  Not yet   Seatbelt: yes     Concerns today: Thinks he had a kidney stone in the past   Collected stone  Sometimes has to push harder to urinate - for a while   Comes and goes   Sometimes it is fine     Working at [employer]     Had palpitations few weeks ago - felt like his heart was going fast, maybe skipped a beat  No pain or pressure  No pain with exercise  The following portions of the patient's history were reviewed and updated as appropriate: allergies, current medications, past family history, past medical history, past social history, past surgical history and problem list      Current Outpatient Medications   Medication Sig Dispense Refill    escitalopram (LEXAPRO) 10 mg tablet Take 10 mg by mouth daily      ibuprofen (MOTRIN) 600 mg tablet Take 1 tablet (600 mg total) by mouth every 6 (six) hours as needed for mild pain or moderate pain 20 tablet 0     No current facility-administered medications for this visit           Objective:      /80   Pulse 75 Resp 16   Ht 5' 9 69" (1 77 m)   Wt 104 kg (228 lb 3 2 oz)   SpO2 96%   BMI 33 04 kg/m²   BP Readings from Last 3 Encounters:   02/05/21 118/80   01/23/21 148/89   01/03/21 136/91     Wt Readings from Last 3 Encounters:   02/05/21 104 kg (228 lb 3 2 oz)   01/23/21 104 kg (230 lb)   01/03/21 104 kg (230 lb)       Review of Systems  ROS:  all others negative - no chest pain, SOB,  Urine as above, normal bowels  no GERD  sleeping well  mood good  Physical Exam   Constitutional: he appears well-developed and well-nourished  HENT: Head: Normocephalic  Right Ear: External ear normal  Tympanic membrane normal    Left Ear: External ear normal  Tympanic membrane normal    Nose: Nose normal  No mucosal edema, No rhinorrhea  Right sinus exhibits no maxillary sinus tenderness  Left sinus exhibits no maxillary sinus tenderness  Mouth/Throat: Oropharynx is clear and moist    Eyes: Normal conjunctiva  No erythema  No discharge  Neck: No pain on exam  Neck supple  Cardiovascular: Normal rate, regular rhythm and normal heart sounds  Pulmonary/Chest: Effort normal and breath sounds normal  No wheezes  No rales  No rhonchi  Abdominal: Soft  Bowel sounds are normal  There is no tenderness  Musculoskeletal: he exhibits no edema  Lymphadenopathy: he has no cervical adenopathy  Neurological: he  is alert and oriented to person, place, and time  Skin: Skin is warm and dry  No rashes  Psychiatric: he  has a normal mood and affect  his behavior is normal  Thought content normal    Vitals reviewed  BMI Counseling: Body mass index is 33 04 kg/m²  The BMI is above normal  Nutrition recommendations include decreasing portion sizes  Exercise recommendations include exercising 3-5 times per week

## 2021-02-05 ENCOUNTER — TELEPHONE (OUTPATIENT)
Dept: ADMINISTRATIVE | Facility: OTHER | Age: 46
End: 2021-02-05

## 2021-02-05 ENCOUNTER — OFFICE VISIT (OUTPATIENT)
Dept: FAMILY MEDICINE CLINIC | Facility: CLINIC | Age: 46
End: 2021-02-05
Payer: COMMERCIAL

## 2021-02-05 VITALS
BODY MASS INDEX: 32.67 KG/M2 | DIASTOLIC BLOOD PRESSURE: 80 MMHG | OXYGEN SATURATION: 96 % | SYSTOLIC BLOOD PRESSURE: 118 MMHG | HEIGHT: 70 IN | WEIGHT: 228.2 LBS | RESPIRATION RATE: 16 BRPM | HEART RATE: 75 BPM

## 2021-02-05 DIAGNOSIS — R00.2 PALPITATIONS: ICD-10-CM

## 2021-02-05 DIAGNOSIS — Z00.00 WELL ADULT EXAM: Primary | ICD-10-CM

## 2021-02-05 DIAGNOSIS — E78.1 HYPERTRIGLYCERIDEMIA: ICD-10-CM

## 2021-02-05 DIAGNOSIS — E66.09 CLASS 1 OBESITY DUE TO EXCESS CALORIES WITHOUT SERIOUS COMORBIDITY WITH BODY MASS INDEX (BMI) OF 33.0 TO 33.9 IN ADULT: ICD-10-CM

## 2021-02-05 PROCEDURE — 93000 ELECTROCARDIOGRAM COMPLETE: CPT | Performed by: FAMILY MEDICINE

## 2021-02-05 PROCEDURE — 3008F BODY MASS INDEX DOCD: CPT | Performed by: FAMILY MEDICINE

## 2021-02-05 PROCEDURE — 1036F TOBACCO NON-USER: CPT | Performed by: FAMILY MEDICINE

## 2021-02-05 PROCEDURE — 99386 PREV VISIT NEW AGE 40-64: CPT | Performed by: FAMILY MEDICINE

## 2021-02-05 PROCEDURE — 3725F SCREEN DEPRESSION PERFORMED: CPT | Performed by: FAMILY MEDICINE

## 2021-02-05 NOTE — TELEPHONE ENCOUNTER
----- Message from Ann Hilton, [address] [place] sent at 2/5/2021  9:23 AM EST -----  Regarding: JARET RAO [facility]  02/05/21 9:24 AM    Hello, our patient Sophie Keyes has had Hepatitis C and HIV completed/performed  Please assist in updating the patient chart by pulling a previous Electronic Medical Record (EMR) document  The previous EMR is CE  The date of service is 1/14/15      Thank you,  Ann Hilton MA  [facility]  Demarco Pickard

## 2021-02-05 NOTE — TELEPHONE ENCOUNTER
Upon review of the In Basket request we were able to locate, review, and update the patient chart as requested for Hepatitis C  and HIV  Any additional questions or concerns should be emailed to the Practice Liaisons via [email or web address] email, please do not reply via In Basket      Thank you  Ezra Navarrete

## 2021-02-05 NOTE — PATIENT INSTRUCTIONS
Apps and Websites for Counseling, Anxiety/Depression, Chronic Pain, Lifestyle Change, Problem-solving, Self-Esteem, Anger Management, Coping with Uncertainty    (Prices current as of 9/5/19)    1  Mood Kit - Available in [employer] Store for $4 99  Supports a person's success in specific situations, includes thought , mood tracker, and journal   Can be accessed in an unstructured way and used as an unguided self-help tremayne  2   Moodnotes - Available in [employer] Store for $4 99  Focuses on healthier thinking habits and identifying "traps" in thinking  Tracks mood over a period of time and identifies factors that influence mood  3   MoodMission - Available in [facility] for free  Includes five "missions" to promote confidence in handling stressors and coping in specific situations  The tremayne personalizes its style and techniques based on when the user engages it most frequently  In-tremayne rewards are used to motivate, increase fun and self-confidence  Helpful for patients who need improvement in mood or a decrease in anxiety and depression symptoms  4    What's Up - Available in [employer] for free  Recognizes negative thinking patterns and methods to overcome them  Uses helpful metaphors, a catastrophe scale, grounding techniques, and breathing exercises  Has the ability to sync data across multiple devices and protect this information with a unique pass code  Has the capability to be active in forums where people can discuss similar feelings and strategies that have been helpful  5   Moodpath - Available in [employer] for free  Uses daily screenings to create a better understanding of thoughts, feelings, and emotions  When needed, it provides a discussion guide to talking with a medical professional based on the responses to daily screenings    Includes over 150 psychological exercises and videos to promote and strengthen overall mental health  6   MindShift - Available in [employer] for free  Helpful for youth and young adults in coping with anxiety  Creates an individualized toolbox to help users deal with test anxiety, perfectionism, social anxiety, worry, panic, and conflict  Includes directions on how to construct belief experiments to trial common beliefs that feed anxiety, guided relaxation, as well as tools and tips to help establish and accomplish goals  Differentiates between helpful and unhelpful anxiety, and explains how to overcome fears by gradually facing them in manageable steps  7   CBT-I  - Available in [employer] for free  For insomnia  Educates about sleep, developing positive sleep routines, and improved sleep environment  Encourages user to change behaviors which will provide confidence for better sleep on a regular basis  8   [employer] uk - Free website  Provides self-help and therapy resources that encourages change to combat negative and destructive thought patterns  Includes access to numerous handouts on a variety of symptoms related to anxiety, depression, low self-esteem, panic attacks, social disorders, and more  The solution section has interventions that can be printed and saved for future use  9   [employer]ot - Available in [employer] for free  Recommended for age 16+  Friendly self-care  that helps you think through situations with step-by-step guidance using counseling tools, learn about yourself with intelligent mood tracking, and master skills to reduce stress and live happier through 100+ evidence-based stories from clinicians  Chat as often or as little as you'd like, whenever you'd like to  10   Madeline:  GILMA  CBT DBT Chatbot - Available in Apple appsstore and Google Play for free, has in-kentrell purchases  Recommended for 12+  Psychologist support at $30/month, 50% off to start    Levar Montemayor / chatbot is free  Uses techniques of CBT, DBT, yoga, and meditation to support your needs regarding stress, anxiety, sleep, loss, and a full range of other mental health and wellness issues  11   Curable Pain Relief - Available in [employer] store and Google Play for free, has in-tremayne purchases  Teaches about the chronic pain cycle and how to reverse it, while retraining your brain and nervous system for long-term results  Smart  Catina guides user through updates in pain science to identify, target, eliminate the factors that are keeping user "stuck" in the pain cycle  12   [facility] - Available in [employer] store and Google Play for a fee  Subscription service, starting at $59/week (billed monthly)  All plans include unlimited messaging, and add live video sessions if desired  Unlimited messaging therapy for teens ages 15-14 and special services for couples therapy  You can change therapists or stop subscription renewal at any time  Recommended for 13+  User is matched with a licensed therapist in your state and communicate on your device by text, audio, and video from anywhere, at any time  User will hear back at least once a day, 5 days per week

## 2021-02-10 ENCOUNTER — ANESTHESIA EVENT (OUTPATIENT)
Dept: PERIOP | Facility: HOSPITAL | Age: 46
End: 2021-02-10
Payer: COMMERCIAL

## 2021-02-10 RX ORDER — ONDANSETRON 4 MG/1
4 TABLET, FILM COATED ORAL EVERY 8 HOURS PRN
COMMUNITY
End: 2021-12-03

## 2021-02-10 RX ORDER — PHENOL 1.4 %
AEROSOL, SPRAY (ML) MUCOUS MEMBRANE
COMMUNITY

## 2021-02-10 NOTE — PRE-PROCEDURE INSTRUCTIONS
Pre-Surgery Instructions:   Medication Instructions    escitalopram (LEXAPRO) 10 mg tablet Instructed patient per Anesthesia Guidelines   ibuprofen (MOTRIN) 600 mg tablet Instructed patient per Anesthesia Guidelines   Melatonin 10 MG TABS Instructed patient per Anesthesia Guidelines   ondansetron (ZOFRAN) 4 mg tablet Instructed patient per Anesthesia Guidelines  All pre-op instructions provided w/ pt verb understanding per [facility] pt education  NPO post MN  Inst to avoid ASA, otc vit/supp  Inst to hold ibuprofen, told tylenol is ok prn  Inst zofran ok prn w/ sip water  Pre-op shower x 2 reviewed  Current hosp visitor policy reviewed

## 2021-02-11 ENCOUNTER — HOSPITAL ENCOUNTER (OUTPATIENT)
Facility: HOSPITAL | Age: 46
Setting detail: OUTPATIENT SURGERY
Discharge: HOME | End: 2021-02-11
Attending: UROLOGY | Admitting: UROLOGY
Payer: COMMERCIAL

## 2021-02-11 ENCOUNTER — TELEPHONE (OUTPATIENT)
Dept: OTHER | Facility: OTHER | Age: 46
End: 2021-02-11

## 2021-02-11 ENCOUNTER — TELEPHONE (OUTPATIENT)
Dept: UROLOGY | Facility: MEDICAL CENTER | Age: 46
End: 2021-02-11

## 2021-02-11 ENCOUNTER — APPOINTMENT (OUTPATIENT)
Dept: RADIOLOGY | Facility: HOSPITAL | Age: 46
End: 2021-02-11
Payer: COMMERCIAL

## 2021-02-11 ENCOUNTER — ANESTHESIA (OUTPATIENT)
Dept: PERIOP | Facility: HOSPITAL | Age: 46
End: 2021-02-11
Payer: COMMERCIAL

## 2021-02-11 VITALS
RESPIRATION RATE: 16 BRPM | HEIGHT: 70 IN | SYSTOLIC BLOOD PRESSURE: 142 MMHG | HEART RATE: 60 BPM | OXYGEN SATURATION: 99 % | BODY MASS INDEX: 32.64 KG/M2 | TEMPERATURE: 96.8 F | WEIGHT: 228 LBS | DIASTOLIC BLOOD PRESSURE: 89 MMHG

## 2021-02-11 VITALS — HEART RATE: 81 BPM

## 2021-02-11 DIAGNOSIS — N20.0 NEPHROLITHIASIS: ICD-10-CM

## 2021-02-11 DIAGNOSIS — F41.9 ANXIETY: Primary | ICD-10-CM

## 2021-02-11 DIAGNOSIS — T65.291A TOXIC EFFECT OF TOBACCO AND NICOTINE, ACCIDENTAL OR UNINTENTIONAL, INITIAL ENCOUNTER: Primary | ICD-10-CM

## 2021-02-11 PROCEDURE — C2617 STENT, NON-COR, TEM W/O DEL: HCPCS | Performed by: UROLOGY

## 2021-02-11 PROCEDURE — C1758 CATHETER, URETERAL: HCPCS | Performed by: UROLOGY

## 2021-02-11 PROCEDURE — 74018 RADEX ABDOMEN 1 VIEW: CPT

## 2021-02-11 PROCEDURE — C1769 GUIDE WIRE: HCPCS | Performed by: UROLOGY

## 2021-02-11 PROCEDURE — NC001 PR NO CHARGE: Performed by: UROLOGY

## 2021-02-11 PROCEDURE — 52356 CYSTO/URETERO W/LITHOTRIPSY: CPT | Performed by: UROLOGY

## 2021-02-11 DEVICE — STENT URETERAL 6 FR 26CM INLAY OPTIMA: Type: IMPLANTABLE DEVICE | Site: URETER | Status: FUNCTIONAL

## 2021-02-11 RX ORDER — PHENAZOPYRIDINE HYDROCHLORIDE 200 MG/1
200 TABLET, FILM COATED ORAL 3 TIMES DAILY PRN
Qty: 30 TABLET | Refills: 0 | Status: SHIPPED | OUTPATIENT
Start: 2021-02-11 | End: 2021-02-12 | Stop reason: SDUPTHER

## 2021-02-11 RX ORDER — ONDANSETRON 2 MG/ML
4 INJECTION INTRAMUSCULAR; INTRAVENOUS ONCE AS NEEDED
Status: COMPLETED | OUTPATIENT
Start: 2021-02-11 | End: 2021-02-11

## 2021-02-11 RX ORDER — ACETAMINOPHEN 325 MG/1
975 TABLET ORAL ONCE
Status: DISCONTINUED | OUTPATIENT
Start: 2021-02-11 | End: 2021-02-11 | Stop reason: HOSPADM

## 2021-02-11 RX ORDER — SODIUM CHLORIDE, SODIUM LACTATE, POTASSIUM CHLORIDE, CALCIUM CHLORIDE 600; 310; 30; 20 MG/100ML; MG/100ML; MG/100ML; MG/100ML
20 INJECTION, SOLUTION INTRAVENOUS CONTINUOUS
Status: DISCONTINUED | OUTPATIENT
Start: 2021-02-11 | End: 2021-02-11 | Stop reason: HOSPADM

## 2021-02-11 RX ORDER — PHENAZOPYRIDINE HYDROCHLORIDE 100 MG/1
200 TABLET, FILM COATED ORAL ONCE
Status: DISCONTINUED | OUTPATIENT
Start: 2021-02-11 | End: 2021-02-11 | Stop reason: HOSPADM

## 2021-02-11 RX ORDER — CEFAZOLIN SODIUM 2 G/50ML
SOLUTION INTRAVENOUS AS NEEDED
Status: DISCONTINUED | OUTPATIENT
Start: 2021-02-11 | End: 2021-02-11

## 2021-02-11 RX ORDER — ONDANSETRON 2 MG/ML
INJECTION INTRAMUSCULAR; INTRAVENOUS AS NEEDED
Status: DISCONTINUED | OUTPATIENT
Start: 2021-02-11 | End: 2021-02-11

## 2021-02-11 RX ORDER — TAMSULOSIN HYDROCHLORIDE 0.4 MG/1
0.4 CAPSULE ORAL EVERY EVENING
Qty: 30 CAPSULE | Refills: 0 | Status: SHIPPED | OUTPATIENT
Start: 2021-02-11 | End: 2021-03-05

## 2021-02-11 RX ORDER — DEXAMETHASONE SODIUM PHOSPHATE 10 MG/ML
INJECTION, SOLUTION INTRAMUSCULAR; INTRAVENOUS AS NEEDED
Status: DISCONTINUED | OUTPATIENT
Start: 2021-02-11 | End: 2021-02-11

## 2021-02-11 RX ORDER — MAGNESIUM HYDROXIDE 1200 MG/15ML
LIQUID ORAL AS NEEDED
Status: DISCONTINUED | OUTPATIENT
Start: 2021-02-11 | End: 2021-02-11 | Stop reason: HOSPADM

## 2021-02-11 RX ORDER — LIDOCAINE HYDROCHLORIDE 10 MG/ML
INJECTION, SOLUTION EPIDURAL; INFILTRATION; INTRACAUDAL; PERINEURAL AS NEEDED
Status: DISCONTINUED | OUTPATIENT
Start: 2021-02-11 | End: 2021-02-11

## 2021-02-11 RX ORDER — ACETAMINOPHEN 325 MG/1
650 TABLET ORAL EVERY 6 HOURS PRN
Status: DISCONTINUED | OUTPATIENT
Start: 2021-02-11 | End: 2021-02-11 | Stop reason: HOSPADM

## 2021-02-11 RX ORDER — SODIUM CHLORIDE, SODIUM LACTATE, POTASSIUM CHLORIDE, CALCIUM CHLORIDE 600; 310; 30; 20 MG/100ML; MG/100ML; MG/100ML; MG/100ML
125 INJECTION, SOLUTION INTRAVENOUS CONTINUOUS
Status: DISCONTINUED | OUTPATIENT
Start: 2021-02-11 | End: 2021-02-11 | Stop reason: HOSPADM

## 2021-02-11 RX ORDER — TAMSULOSIN HYDROCHLORIDE 0.4 MG/1
0.4 CAPSULE ORAL
Status: DISCONTINUED | OUTPATIENT
Start: 2021-02-11 | End: 2021-02-11 | Stop reason: HOSPADM

## 2021-02-11 RX ORDER — CEFAZOLIN SODIUM 2 G/50ML
2000 SOLUTION INTRAVENOUS ONCE
Status: DISCONTINUED | OUTPATIENT
Start: 2021-02-11 | End: 2021-02-11 | Stop reason: HOSPADM

## 2021-02-11 RX ORDER — KETOROLAC TROMETHAMINE 10 MG/1
10 TABLET, FILM COATED ORAL EVERY 6 HOURS PRN
Qty: 20 TABLET | Refills: 0 | Status: SHIPPED | OUTPATIENT
Start: 2021-02-11 | End: 2021-12-03

## 2021-02-11 RX ORDER — KETOROLAC TROMETHAMINE 30 MG/ML
15 INJECTION, SOLUTION INTRAMUSCULAR; INTRAVENOUS ONCE
Status: DISCONTINUED | OUTPATIENT
Start: 2021-02-11 | End: 2021-02-11 | Stop reason: HOSPADM

## 2021-02-11 RX ORDER — DIPHENHYDRAMINE HYDROCHLORIDE 50 MG/ML
25 INJECTION INTRAMUSCULAR; INTRAVENOUS ONCE
Status: COMPLETED | OUTPATIENT
Start: 2021-02-11 | End: 2021-02-11

## 2021-02-11 RX ORDER — DIPHENHYDRAMINE HYDROCHLORIDE 50 MG/ML
12.5 INJECTION INTRAMUSCULAR; INTRAVENOUS ONCE AS NEEDED
Status: DISCONTINUED | OUTPATIENT
Start: 2021-02-11 | End: 2021-02-11 | Stop reason: HOSPADM

## 2021-02-11 RX ORDER — PROPOFOL 10 MG/ML
INJECTION, EMULSION INTRAVENOUS AS NEEDED
Status: DISCONTINUED | OUTPATIENT
Start: 2021-02-11 | End: 2021-02-11

## 2021-02-11 RX ORDER — CEPHALEXIN 500 MG/1
500 CAPSULE ORAL EVERY 6 HOURS SCHEDULED
Qty: 12 CAPSULE | Refills: 0 | Status: SHIPPED | OUTPATIENT
Start: 2021-02-11 | End: 2021-02-14

## 2021-02-11 RX ORDER — KETOROLAC TROMETHAMINE 30 MG/ML
INJECTION, SOLUTION INTRAMUSCULAR; INTRAVENOUS AS NEEDED
Status: DISCONTINUED | OUTPATIENT
Start: 2021-02-11 | End: 2021-02-11

## 2021-02-11 RX ORDER — MIDAZOLAM HYDROCHLORIDE 2 MG/2ML
INJECTION, SOLUTION INTRAMUSCULAR; INTRAVENOUS AS NEEDED
Status: DISCONTINUED | OUTPATIENT
Start: 2021-02-11 | End: 2021-02-11

## 2021-02-11 RX ORDER — SODIUM CHLORIDE, SODIUM LACTATE, POTASSIUM CHLORIDE, CALCIUM CHLORIDE 600; 310; 30; 20 MG/100ML; MG/100ML; MG/100ML; MG/100ML
INJECTION, SOLUTION INTRAVENOUS CONTINUOUS PRN
Status: DISCONTINUED | OUTPATIENT
Start: 2021-02-11 | End: 2021-02-11

## 2021-02-11 RX ADMIN — DIPHENHYDRAMINE HYDROCHLORIDE 25 MG: 50 INJECTION, SOLUTION INTRAMUSCULAR; INTRAVENOUS at 09:31

## 2021-02-11 RX ADMIN — ONDANSETRON 4 MG: 2 INJECTION INTRAMUSCULAR; INTRAVENOUS at 07:39

## 2021-02-11 RX ADMIN — DEXAMETHASONE SODIUM PHOSPHATE 4 MG: 10 INJECTION, SOLUTION INTRAMUSCULAR; INTRAVENOUS at 07:39

## 2021-02-11 RX ADMIN — MIDAZOLAM 2 MG: 1 INJECTION INTRAMUSCULAR; INTRAVENOUS at 07:28

## 2021-02-11 RX ADMIN — ACETAMINOPHEN 650 MG: 325 TABLET, FILM COATED ORAL at 08:52

## 2021-02-11 RX ADMIN — ONDANSETRON 4 MG: 2 INJECTION INTRAMUSCULAR; INTRAVENOUS at 08:51

## 2021-02-11 RX ADMIN — SODIUM CHLORIDE, SODIUM LACTATE, POTASSIUM CHLORIDE, AND CALCIUM CHLORIDE: .6; .31; .03; .02 INJECTION, SOLUTION INTRAVENOUS at 07:21

## 2021-02-11 RX ADMIN — LIDOCAINE HYDROCHLORIDE 90 MG: 10 INJECTION, SOLUTION EPIDURAL; INFILTRATION; INTRACAUDAL; PERINEURAL at 07:36

## 2021-02-11 RX ADMIN — KETOROLAC TROMETHAMINE 30 MG: 30 INJECTION, SOLUTION INTRAMUSCULAR at 08:19

## 2021-02-11 RX ADMIN — PROPOFOL 250 MG: 10 INJECTION, EMULSION INTRAVENOUS at 07:36

## 2021-02-11 RX ADMIN — CEFAZOLIN SODIUM 2000 MG: 2 SOLUTION INTRAVENOUS at 07:38

## 2021-02-11 NOTE — PERIOPERATIVE NURSING NOTE
Pt with opiod abuse history, no narcotics writte  Texted anestesia who did not order narcotics, pt aware and chooses to no have narcotics as well

## 2021-02-11 NOTE — ANESTHESIA PREPROCEDURE EVALUATION
Procedure:  CYSTOSCOPY URETEROSCOPY WITH LITHOTRIPSY HOLMIUM LASER, RETROGRADE PYELOGRAM AND INSERTION STENT URETERAL (Left Bladder)    Relevant Problems   CARDIO   (+) Hypertriglyceridemia      /RENAL   (+) Nephrolithiasis      MUSCULOSKELETAL   (+) Cervical spondylosis without myelopathy      NEURO/PSYCH   (+) Anxiety   (+) History of alcohol abuse   (+) History of drug abuse (HCC)   (+) Major depressive disorder, recurrent episode, moderate (HCC)        Physical Exam    Airway    Mallampati score: II         Dental   No notable dental hx     Cardiovascular  Rhythm: regular, Rate: normal,     Pulmonary  Pulmonary exam normal     Other Findings        Anesthesia Plan  ASA Score- 2     Anesthesia Type- general with ASA Monitors  Additional Monitors:   Airway Plan:           Plan Factors-Exercise tolerance (METS): >4 METS  Chart reviewed  Patient is a current smoker  Patient instructed to abstain from smoking on day of procedure  Patient smoked on day of surgery  Induction- intravenous  Postoperative Plan-     Informed Consent- Anesthetic plan and risks discussed with patient  I personally reviewed this patient with the CRNA  Discussed and agreed on the Anesthesia Plan with the CRNA  Jose Chau

## 2021-02-11 NOTE — DISCHARGE INSTRUCTIONS
Expect to see blood in the urine, and to experience urgency/frequency/burning with urination and dribbling  This is normal after urological procedures  Is also normal to experience some nausea after these procedures  Go back your regular diet carefully  It is normal to feel pain in the kidney when urinating and when the bladder is filling due to urine refluxing up to the kidney because of the open stent  The stent is necessary to keep the ureter open to allow clots and swelling to resolve and to allow the kidney to drain properly after instrumentation  Some stones may pass around the stent, but most stones pass after the stent is removed  The presence of the stent makes the ureter wider while it is in and after has been removed, allowing passage of larger fragments  Call for fever greater that 101 5, inability to urinate, prolonged nausea and vomiting, or severe pain not relieved by pain medications  [place] [employer] Keep stent string taped- if it becomes dislodged or gets pulled out early, that is OK- simply remove it completely by pulling on string until it is completely out  No driving/operating machinery for 24 hours, and while taking narcotics  Take over the counter remedy of choice to avoid constipation  Drink plenty of fluids  Avoid taking ibuprofen while taking the Toradol  You may resume all normal activities tomorrow  Our staff will contact you with an appointment to remove the stent next week, then see 1 of us in 6 months with a kidney ultrasound

## 2021-02-11 NOTE — TELEPHONE ENCOUNTER
Forward to clinical     ----- Message from Erin Bowers MD sent at 2/11/2021  8:45 AM EST -----  Please :  the patient with nurse visit for next week to remove stent, and see 1 of us in 6 months with a renal ultrasound

## 2021-02-11 NOTE — H&P
H&P Exam - Urology   Cynthia Roland 1975 [id number]      Assessment/Plan     Assessment:  Left 1 1 cm UPJ stone  Plan:  Cystoscopy, left ureteroscopy laser lithotripsy left ureteral stent placement  Retrograde if necessary  History of Present Illness   HPI:  The patient presents for cystoscopy, left ureteroscopy laser lithotripsy and left ureteral stent placement for left UPJ stone    The risks of bleeding, infection, damage to the ureter and need for additional procedures has been explained and informed consent given  PMH/PSH reviewed    Review of systems:    General: No fever  CVS: No chest pain or new SOB  Abdomen: No diarrhea or blood in stool  : No new voiding difficulties  Neuro: No syncope/weakness/loss of sensation/paresis  Ophthalmologic: No new visual changes  Ortho: No new back/joint pains  Social History- as per previous notes  Family History: non-contributory    Meds/Allergies   PTA meds:   Prior to Admission Medications   Prescriptions Last Dose Informant Patient Reported? Taking? Melatonin 10 MG TABS Past Week at Unknown time Self Yes Yes   Sig: Take by mouth daily at bedtime   escitalopram (LEXAPRO) 10 mg tablet 2/10/2021 at Unknown time  Yes Yes   Sig: Take 10 mg by mouth daily   ibuprofen (MOTRIN) 600 mg tablet Past Week at Unknown time  No Yes   Sig: Take 1 tablet (600 mg total) by mouth every 6 (six) hours as needed for mild pain or moderate pain   ondansetron (ZOFRAN) 4 mg tablet Past Week at Unknown time  Yes Yes   Sig: Take 4 mg by mouth every 8 (eight) hours as needed for nausea or vomiting      Facility-Administered Medications: None         Objective   Vitals: /79   Pulse 80   Temp 97 8 °F (36 6 °C) (Tympanic)   Resp 18   Ht 5' 9 5" (1 765 m)   Wt 103 kg (228 lb)   SpO2 98%   BMI 33 19 kg/m²     No intake/output data recorded  Physical Exam:    Awake, alert, in NAD      HEENT: Atraumatic, Normocephalic    Lungs: Normal Respiratory effort, no wheezes,stridor or rales  Abdomen: Nondistended  Extremiites: Normal ROM, no cyanosis/edema  Lab Results: I have personally reviewed pertinent reports  CBC: No results found for: WBC, HGB, HCT, MCV, PLT, ADJUSTEDWBC, MCH, MCHC, RDW, MPV, NRBC  CMP: No results found for: SODIUM, CL, CO2, ANIONGAP, BUN, CREATININE, GLUCOSE, CALCIUM, AST, ALT, ALKPHOS, PROT, BILITOT, EGFR  Urinalysis: No results found for: Delbert Torres, SPECGRAV, PHUR, LEUKOCYTESUR, NITRITE, PROTEINUA, GLUCOSEU, KETONESU, BILIRUBINUR, BLOODU  Urine Culture: No results found for: URINECX  Imaging: I have personally reviewed pertinent reports     and I have personally reviewed pertinent films in PACS

## 2021-02-11 NOTE — ANESTHESIA POSTPROCEDURE EVALUATION
Post-Op Assessment Note    CV Status:  Stable  Pain Score: 0    Pain management: adequate     Mental Status:  Alert and awake   Hydration Status:  Euvolemic   PONV Controlled:  Controlled   Airway Patency:  Patent      Post Op Vitals Reviewed: Yes      Staff: CRNA   Comments: arousable, following commands and verbally responsive  Denies any discomfort at this time  RRR, Nonlabored, VSS  No complications documented      BP      Temp 97 5 °F (36 4 °C) (02/11/21 0900)    Pulse 64 (02/11/21 0900)   Resp 16 (02/11/21 0900)    SpO2 98 % (02/11/21 0900)

## 2021-02-11 NOTE — TELEPHONE ENCOUNTER
Called patient - LMOM to call the office back to schedule string stent pull with nurse next week    Will need a F/U in 6 months with ultrasound PTV with any provide

## 2021-02-11 NOTE — TELEPHONE ENCOUNTER
Pt's wife called stating her  would like to start taking lexapro 10mg 1 tab daily, send over to [facility]

## 2021-02-11 NOTE — OP NOTE
OPERATIVE REPORT  PATIENT NAME: Arabella Mcrae  :  1975  MRN: [id number]  Pt Location: [facility] OR ROOM 06    SURGERY DATE:  2021    Surgeon(s) and Role:     * Chichi Cole MD - Primary    Preop Diagnosis:  Calculus of ureter [n20 1] left UPJ    Post-Op Diagnosis Codes:     * Calculus of ureter [N20 1] left UPJ    Procedure(s) (LRB):  CYSTOSCOPY,  Left URETEROSCOPY   LITHOTRISPY HOLMIUM LASER   INSERTION STENT left URETERAL     Specimen(s):  None    Estimated Blood Loss:   Minimal    Drains:  None      Anesthesia Type:   General    Operative Indications:  11 mm left UPJ stone with left flank pain and hydronephrosis    Operative Findings:  11 mm radiopaque left UPJ stone broken up into tiny passable fragments  String left taped to the dorsal penis for stent removal next week by the nurses  Complications:   None    Procedure and Technique:     The patient was brought to the operating room and identified properly  The patient was prepared and draped in the dorsolithotomy position after general anesthesia was induced, and a time out performed  Care was taken during positioning to protect all extremities  Cystoscopy was performed with 22 Stateless cysto sheath and 30  degree lens  The urethra was normal without stricture  The prostate was non-obstructive in appearance  The bladder was smooth, nontrabeculated and there were no stones, tumors or other abnormalities  The 0 035 inch wire was fed into the left ureteral orifice and fed up into the renal pelvis  Fluoroscopy revealed a radioopaque stone  A dual lumen catheter was placed over the wire fluoroscopically into the proximal ureter, and a second wire was deplyed  Using one wire clamped to the drapes as a safety wire, a 10 Stateless 35 cm access sheath was placed, and the flexible ureteroscope was advanced    A stone at the UPJ was seen, and holmium laser lithotripsy was carried out with the 200 micron holmium laser fiber, breaking the stone up into small, passable fragments  A 6 [place]ian by 26 cm ureteral stent with string attached was deployed, and coils were established in the renal pelvis and bladder  The bladder was drained with the cysto sheath, the stent string taped to the penis, and the pt awakened         I was present for the entire procedure and A qualified resident physician was not available    Patient Disposition:  PACU  and extubated and stable    SIGNATURE: Candy Wells MD

## 2021-02-12 RX ORDER — ESCITALOPRAM OXALATE 10 MG/1
10 TABLET ORAL DAILY
Qty: 90 TABLET | Refills: 1 | Status: SHIPPED | OUTPATIENT
Start: 2021-02-12 | End: 2021-11-22 | Stop reason: SDUPTHER

## 2021-02-12 RX ORDER — OXYBUTYNIN CHLORIDE 5 MG/1
5 TABLET ORAL 3 TIMES DAILY PRN
Qty: 30 TABLET | Refills: 0 | Status: SHIPPED | OUTPATIENT
Start: 2021-02-12 | End: 2021-12-03

## 2021-02-12 RX ORDER — PHENAZOPYRIDINE HYDROCHLORIDE 200 MG/1
200 TABLET, FILM COATED ORAL 3 TIMES DAILY PRN
Qty: 30 TABLET | Refills: 0 | Status: SHIPPED | OUTPATIENT
Start: 2021-02-12 | End: 2021-12-03

## 2021-02-12 NOTE — TELEPHONE ENCOUNTER
Called patient - he is told that a Rx was sent to the pharmacy  He will remove the stent himself at home on Thursday  He is asked to call if there is a problem and to call if once the stent is out  He did not want to schedule a follow up now but will call back once he looks at his schedule

## 2021-02-12 NOTE — TELEPHONE ENCOUNTER
Called patient - he wishes to take out the stent himself next week  He is complaining that it burns really bad when he urinates  He is urinating often with blood  He is requesting something be called to [facility] in [place]  (No narcotics) patient has a history of substance abuse  When we call back please schedule patient for 6 month follow up with ultrasound PTV

## 2021-02-13 NOTE — TELEPHONE ENCOUNTER
Please cancel pt's appointment and let him know Lexapro was sent in  He was just seen last week and Lexapro was listed on his med list  He just needs a refill  He does not need another appointment

## 2021-02-15 NOTE — TELEPHONE ENCOUNTER
Placed call to patient, made aware Lexapro was sent in and appointment was cancelled  Patient had no additional questions

## 2021-02-26 NOTE — TELEPHONE ENCOUNTER
Called patient - he states that he pulled the stent out on his own  He is not having any problems at this time  Will place him on the callback list for August (6 months)  Patient didn't want to schedule at this time

## 2021-03-05 DIAGNOSIS — N20.0 NEPHROLITHIASIS: ICD-10-CM

## 2021-03-05 RX ORDER — TAMSULOSIN HYDROCHLORIDE 0.4 MG/1
CAPSULE ORAL
Qty: 30 CAPSULE | Refills: 0 | Status: SHIPPED | OUTPATIENT
Start: 2021-03-05 | End: 2021-12-03

## 2021-03-31 DIAGNOSIS — Z23 ENCOUNTER FOR IMMUNIZATION: ICD-10-CM

## 2021-08-04 ENCOUNTER — TELEPHONE (OUTPATIENT)
Dept: FAMILY MEDICINE CLINIC | Facility: CLINIC | Age: 46
End: 2021-08-04

## 2021-08-04 NOTE — TELEPHONE ENCOUNTER
Left detailed message (Lieutenant Heck per communication form in chart ) informing patient to complete fasting labs  and to call the office with any further questions or concerns

## 2021-08-08 PROBLEM — E66.09 CLASS 1 OBESITY DUE TO EXCESS CALORIES WITH SERIOUS COMORBIDITY AND BODY MASS INDEX (BMI) OF 33.0 TO 33.9 IN ADULT: Status: ACTIVE | Noted: 2021-02-04

## 2021-08-08 PROBLEM — E66.811 CLASS 1 OBESITY DUE TO EXCESS CALORIES WITH SERIOUS COMORBIDITY AND BODY MASS INDEX (BMI) OF 33.0 TO 33.9 IN ADULT: Status: ACTIVE | Noted: 2021-02-04

## 2021-11-22 DIAGNOSIS — F41.9 ANXIETY: ICD-10-CM

## 2021-11-22 RX ORDER — ESCITALOPRAM OXALATE 10 MG/1
10 TABLET ORAL DAILY
Qty: 90 TABLET | Refills: 1 | Status: SHIPPED | OUTPATIENT
Start: 2021-11-22 | End: 2022-07-15 | Stop reason: SDUPTHER

## 2021-12-02 ENCOUNTER — APPOINTMENT (OUTPATIENT)
Dept: LAB | Facility: CLINIC | Age: 46
End: 2021-12-02
Payer: COMMERCIAL

## 2021-12-02 DIAGNOSIS — E78.1 HYPERTRIGLYCERIDEMIA: ICD-10-CM

## 2021-12-02 DIAGNOSIS — R00.2 PALPITATIONS: ICD-10-CM

## 2021-12-02 LAB
CHOLEST SERPL-MCNC: 200 MG/DL
HDLC SERPL-MCNC: 40 MG/DL
LDLC SERPL CALC-MCNC: 135 MG/DL (ref 0–100)
NONHDLC SERPL-MCNC: 160 MG/DL
TRIGL SERPL-MCNC: 126 MG/DL
TSH SERPL DL<=0.05 MIU/L-ACNC: 1.88 UIU/ML (ref 0.36–3.74)

## 2021-12-02 PROCEDURE — 84443 ASSAY THYROID STIM HORMONE: CPT

## 2021-12-02 PROCEDURE — 80061 LIPID PANEL: CPT

## 2021-12-02 PROCEDURE — 36415 COLL VENOUS BLD VENIPUNCTURE: CPT

## 2021-12-03 ENCOUNTER — OFFICE VISIT (OUTPATIENT)
Dept: FAMILY MEDICINE CLINIC | Facility: CLINIC | Age: 46
End: 2021-12-03
Payer: COMMERCIAL

## 2021-12-03 VITALS
DIASTOLIC BLOOD PRESSURE: 70 MMHG | OXYGEN SATURATION: 97 % | BODY MASS INDEX: 35.32 KG/M2 | RESPIRATION RATE: 18 BRPM | HEART RATE: 85 BPM | HEIGHT: 70 IN | SYSTOLIC BLOOD PRESSURE: 102 MMHG | TEMPERATURE: 97.6 F | WEIGHT: 246.7 LBS

## 2021-12-03 DIAGNOSIS — R73.01 IFG (IMPAIRED FASTING GLUCOSE): ICD-10-CM

## 2021-12-03 DIAGNOSIS — F17.200 SMOKER: ICD-10-CM

## 2021-12-03 DIAGNOSIS — F41.9 ANXIETY: ICD-10-CM

## 2021-12-03 DIAGNOSIS — R74.8 ELEVATED LIVER ENZYMES: ICD-10-CM

## 2021-12-03 DIAGNOSIS — E66.01 CLASS 2 SEVERE OBESITY DUE TO EXCESS CALORIES WITH SERIOUS COMORBIDITY AND BODY MASS INDEX (BMI) OF 35.0 TO 35.9 IN ADULT (HCC): ICD-10-CM

## 2021-12-03 DIAGNOSIS — E55.9 VITAMIN D DEFICIENCY: ICD-10-CM

## 2021-12-03 DIAGNOSIS — E78.1 HYPERTRIGLYCERIDEMIA: Primary | ICD-10-CM

## 2021-12-03 PROBLEM — E66.812 CLASS 2 SEVERE OBESITY DUE TO EXCESS CALORIES WITH SERIOUS COMORBIDITY AND BODY MASS INDEX (BMI) OF 35.0 TO 35.9 IN ADULT (HCC): Status: ACTIVE | Noted: 2021-02-04

## 2021-12-03 PROCEDURE — 99214 OFFICE O/P EST MOD 30 MIN: CPT | Performed by: FAMILY MEDICINE

## 2021-12-03 PROCEDURE — 1036F TOBACCO NON-USER: CPT | Performed by: FAMILY MEDICINE

## 2021-12-03 PROCEDURE — 3008F BODY MASS INDEX DOCD: CPT | Performed by: FAMILY MEDICINE

## 2021-12-03 PROCEDURE — 3725F SCREEN DEPRESSION PERFORMED: CPT | Performed by: FAMILY MEDICINE

## 2022-05-27 ENCOUNTER — RA CDI HCC (OUTPATIENT)
Dept: OTHER | Facility: HOSPITAL | Age: 47
End: 2022-05-27

## 2022-05-27 NOTE — PROGRESS NOTES
[facility]  coding opportunities       Chart reviewed, no opportunity found: CHART REVIEWED, NO OPPORTUNITY FOUND        Patients Insurance        Commercial Insurance: Vince Brewer

## 2022-05-29 PROBLEM — K57.90 DIVERTICULOSIS: Status: ACTIVE | Noted: 2022-05-29

## 2022-07-15 DIAGNOSIS — F41.9 ANXIETY: ICD-10-CM

## 2022-07-15 RX ORDER — ESCITALOPRAM OXALATE 10 MG/1
10 TABLET ORAL DAILY
Qty: 90 TABLET | Refills: 1 | Status: SHIPPED | OUTPATIENT
Start: 2022-07-15 | End: 2022-09-23 | Stop reason: SDUPTHER

## 2022-07-15 NOTE — TELEPHONE ENCOUNTER
Medication refill requested: angeline   Last office visit: 12/3/21  Next office visit: 9/23/22  Last refilled: 11/22/21  Labs: No  Ordering Provider: [facility] (select pharmacy send RX to):   [facility]/pharmacy #[id number]- [address], [address] [facility]  [address] [place] [address]  Phone: [phone] Fax: [phone]

## 2022-07-15 NOTE — TELEPHONE ENCOUNTER
Patient is completely out of his medication,would you please send it over to his local pharmacy today for him

## 2022-09-23 ENCOUNTER — APPOINTMENT (OUTPATIENT)
Dept: LAB | Facility: CLINIC | Age: 47
End: 2022-09-23
Payer: COMMERCIAL

## 2022-09-23 ENCOUNTER — OFFICE VISIT (OUTPATIENT)
Dept: FAMILY MEDICINE CLINIC | Facility: CLINIC | Age: 47
End: 2022-09-23
Payer: COMMERCIAL

## 2022-09-23 VITALS
HEIGHT: 70 IN | RESPIRATION RATE: 16 BRPM | TEMPERATURE: 97.4 F | BODY MASS INDEX: 33.36 KG/M2 | WEIGHT: 233 LBS | SYSTOLIC BLOOD PRESSURE: 116 MMHG | OXYGEN SATURATION: 96 % | HEART RATE: 88 BPM | DIASTOLIC BLOOD PRESSURE: 70 MMHG

## 2022-09-23 DIAGNOSIS — E66.09 CLASS 1 OBESITY DUE TO EXCESS CALORIES WITH SERIOUS COMORBIDITY AND BODY MASS INDEX (BMI) OF 33.0 TO 33.9 IN ADULT: ICD-10-CM

## 2022-09-23 DIAGNOSIS — M48.02 CERVICAL STENOSIS OF SPINAL CANAL: ICD-10-CM

## 2022-09-23 DIAGNOSIS — Z12.11 SCREENING FOR COLON CANCER: ICD-10-CM

## 2022-09-23 DIAGNOSIS — F41.9 ANXIETY: ICD-10-CM

## 2022-09-23 DIAGNOSIS — G89.29 CHRONIC RIGHT SHOULDER PAIN: ICD-10-CM

## 2022-09-23 DIAGNOSIS — R10.9 LEFT FLANK PAIN: ICD-10-CM

## 2022-09-23 DIAGNOSIS — E78.1 HYPERTRIGLYCERIDEMIA: ICD-10-CM

## 2022-09-23 DIAGNOSIS — R73.01 IFG (IMPAIRED FASTING GLUCOSE): ICD-10-CM

## 2022-09-23 DIAGNOSIS — E55.9 VITAMIN D DEFICIENCY: ICD-10-CM

## 2022-09-23 DIAGNOSIS — R73.01 IFG (IMPAIRED FASTING GLUCOSE): Primary | ICD-10-CM

## 2022-09-23 DIAGNOSIS — M25.511 CHRONIC RIGHT SHOULDER PAIN: ICD-10-CM

## 2022-09-23 DIAGNOSIS — R74.8 ELEVATED LIVER ENZYMES: ICD-10-CM

## 2022-09-23 DIAGNOSIS — Z00.00 WELL ADULT EXAM: Primary | ICD-10-CM

## 2022-09-23 DIAGNOSIS — R39.198 SUBJECTIVE CHANGE IN URINATION: ICD-10-CM

## 2022-09-23 LAB
25(OH)D3 SERPL-MCNC: 29.4 NG/ML (ref 30–100)
ALBUMIN SERPL BCP-MCNC: 4.8 G/DL (ref 3.5–5)
ALP SERPL-CCNC: 82 U/L (ref 34–104)
ALT SERPL W P-5'-P-CCNC: 27 U/L (ref 7–52)
ANION GAP SERPL CALCULATED.3IONS-SCNC: 7 MMOL/L (ref 4–13)
AST SERPL W P-5'-P-CCNC: 17 U/L (ref 13–39)
BACTERIA UR QL AUTO: ABNORMAL /HPF
BILIRUB SERPL-MCNC: 0.64 MG/DL (ref 0.2–1)
BILIRUB UR QL STRIP: NEGATIVE
BUN SERPL-MCNC: 25 MG/DL (ref 5–25)
CALCIUM SERPL-MCNC: 9.5 MG/DL (ref 8.4–10.2)
CAOX CRY URNS QL MICRO: ABNORMAL /HPF
CHLORIDE SERPL-SCNC: 105 MMOL/L (ref 96–108)
CHOLEST SERPL-MCNC: 204 MG/DL
CLARITY UR: CLEAR
CO2 SERPL-SCNC: 26 MMOL/L (ref 21–32)
COLOR UR: ABNORMAL
CREAT SERPL-MCNC: 0.76 MG/DL (ref 0.6–1.3)
GFR SERPL CREATININE-BSD FRML MDRD: 107 ML/MIN/1.73SQ M
GLUCOSE P FAST SERPL-MCNC: 105 MG/DL (ref 65–99)
GLUCOSE UR STRIP-MCNC: NEGATIVE MG/DL
HDLC SERPL-MCNC: 42 MG/DL
HGB UR QL STRIP.AUTO: NEGATIVE
KETONES UR STRIP-MCNC: NEGATIVE MG/DL
LDLC SERPL CALC-MCNC: 130 MG/DL (ref 0–100)
LEUKOCYTE ESTERASE UR QL STRIP: NEGATIVE
NITRITE UR QL STRIP: NEGATIVE
NON-SQ EPI CELLS URNS QL MICRO: ABNORMAL /HPF
NONHDLC SERPL-MCNC: 162 MG/DL
PH UR STRIP.AUTO: 5.5 [PH]
POTASSIUM SERPL-SCNC: 4.4 MMOL/L (ref 3.5–5.3)
PROT SERPL-MCNC: 8.1 G/DL (ref 6.4–8.4)
PROT UR STRIP-MCNC: ABNORMAL MG/DL
RBC #/AREA URNS AUTO: ABNORMAL /HPF
SL AMB  POCT GLUCOSE, UA: ABNORMAL
SL AMB LEUKOCYTE ESTERASE,UA: ABNORMAL
SL AMB POCT BILIRUBIN,UA: ABNORMAL
SL AMB POCT BLOOD,UA: ABNORMAL
SL AMB POCT CLARITY,UA: CLEAR
SL AMB POCT COLOR,UA: YELLOW
SL AMB POCT KETONES,UA: ABNORMAL
SL AMB POCT NITRITE,UA: ABNORMAL
SL AMB POCT PH,UA: 5.5
SL AMB POCT SPECIFIC GRAVITY,UA: 1.03
SL AMB POCT URINE PROTEIN: 0.15
SL AMB POCT UROBILINOGEN: 3.5
SODIUM SERPL-SCNC: 138 MMOL/L (ref 135–147)
SP GR UR STRIP.AUTO: 1.03 (ref 1–1.03)
TRIGL SERPL-MCNC: 160 MG/DL
UROBILINOGEN UR STRIP-ACNC: <2 MG/DL
WBC #/AREA URNS AUTO: ABNORMAL /HPF

## 2022-09-23 PROCEDURE — 87086 URINE CULTURE/COLONY COUNT: CPT | Performed by: FAMILY MEDICINE

## 2022-09-23 PROCEDURE — 99214 OFFICE O/P EST MOD 30 MIN: CPT | Performed by: FAMILY MEDICINE

## 2022-09-23 PROCEDURE — 80061 LIPID PANEL: CPT

## 2022-09-23 PROCEDURE — 81001 URINALYSIS AUTO W/SCOPE: CPT | Performed by: FAMILY MEDICINE

## 2022-09-23 PROCEDURE — 80053 COMPREHEN METABOLIC PANEL: CPT

## 2022-09-23 PROCEDURE — 81003 URINALYSIS AUTO W/O SCOPE: CPT | Performed by: FAMILY MEDICINE

## 2022-09-23 PROCEDURE — 99396 PREV VISIT EST AGE 40-64: CPT | Performed by: FAMILY MEDICINE

## 2022-09-23 PROCEDURE — 36415 COLL VENOUS BLD VENIPUNCTURE: CPT

## 2022-09-23 PROCEDURE — 82306 VITAMIN D 25 HYDROXY: CPT

## 2022-09-23 RX ORDER — ESCITALOPRAM OXALATE 10 MG/1
10 TABLET ORAL DAILY
Qty: 90 TABLET | Refills: 1 | Status: SHIPPED | OUTPATIENT
Start: 2022-09-23

## 2022-09-23 RX ORDER — GABAPENTIN 300 MG/1
300 CAPSULE ORAL
Qty: 90 CAPSULE | Refills: 1 | Status: SHIPPED | OUTPATIENT
Start: 2022-09-23

## 2022-09-23 RX ORDER — MULTIVIT-MIN/IRON/FOLIC ACID/K 18-600-40
2000 CAPSULE ORAL DAILY
Start: 2022-09-23

## 2022-09-23 NOTE — PATIENT INSTRUCTIONS
[facility]  [address] [facility], [address], [address] [facility] [place],   [place], [address]  [phone]

## 2022-09-23 NOTE — PROGRESS NOTES
Assessment/Plan:      1  Well adult exam  See below     2  Anxiety  Stable on lexapro   - escitalopram (LEXAPRO) 10 mg tablet; Take 1 tablet (10 mg total) by mouth daily  Dispense: 90 tablet; Refill: 1    3  Cervical stenosis of spinal canal  Worsening pain, now with new radiculopathy symptoms on right  Has not had imaging in a long time - recommend update MRI  Saw neurosurgery 5 years ago   After imaging he is willing to see neurosurgery vs pain mgmt depending on results   Will start Neurontin for pain - reviewed risks and benefits of this medication including low but possible dependence risk  He will take this at night only due to possible sedation    - gabapentin (Neurontin) 300 mg capsule; Take 1 capsule (300 mg total) by mouth daily at bedtime Increase as directed  Dispense: 90 capsule; Refill: 1    4  Chronic right shoulder pain  To see ortho for possible injection into joint  Wishes to see a different doctor from whom he saw before  - Ambulatory Referral to Orthopedic Surgery; Future    5  Subjective change in urination  See below   - Ambulatory Referral to Urology; Future  - Urinalysis with microscopic  - Urine culture; Future  - Urine culture    6  Left flank pain  Suspect kidney stone  If not may need xray to look at area of pain  - POCT urine dip auto non-scope  - Urinalysis with microscopic  - Urine culture; Future  - Urine culture    7  Class 1 obesity due to excess calories with serious comorbidity and body mass index (BMI) of 33 0 to 33 9 in adult  Encouraged diet and exercise to help for a healthier BMI  8  IFG (impaired fasting glucose)  Update labs     9  Hypertriglyceridemia  Update labs     10  Screening for colon cancer  Agrees to   - Cologuard          Well adult exam  ·         Continue healthy diet   ·         Encourage exercise 4 times a week or more for minimum 30 minutes  ·         Continue to see dentist, wear seatbelt    ·         Health maintenance reviewed - declines colonoscopy  Agrees to cologuard  Will get fasting blood work today  Declines flu and COVID vaccines  Reviewed age appropriate health maintenance screenings and immunizations that are due, risks and benefits of these  Health Maintenance   Topic Date Due    Colorectal Cancer Screening  Never done    Influenza Vaccine (1) 09/01/2022    Depression Remission PHQ  12/03/2022    BMI: Followup Plan  09/23/2023    BMI: Adult  09/23/2023    Annual Physical  09/23/2023    DTaP,Tdap,and Td Vaccines (3 - Td or Tdap) 01/14/2025    HIV Screening  Completed    Hepatitis C Screening  Completed    COVID-19 Vaccine  Completed    Pneumococcal Vaccine: Pediatrics (0 to 5 Years) and At-Risk Patients (6 to 59 Years)  Aged Out    HIB Vaccine  Aged Out    Hepatitis B Vaccine  Aged Out    IPV Vaccine  Aged Out    Hepatitis A Vaccine  Aged Out    Meningococcal ACWY Vaccine  Aged Out    HPV Vaccine  Aged Out     No follow-ups on file  Subjective:    MIRTA Kruse is a 49 y o  male who presents today for a physical      Chief Complaint   Patient presents with    Follow-up     6 mo f/u  No new problems or concerns at this time   Medication Refill     Needs a refill of Lexapro today  Pending order   HM     Interesed in Cologard  Pending order  Patient Care Team:  Reyes Mccauley, DO as PCP - General (Family Medicine)    PHQ-2/9 Depression Screening          ?    ---Above per clinical staff & reviewed  ---  Patient here today for a physical:    Diet: was doing intermittent fasting - gained weight back when he went on vacation  8 pounds back  Low carb again     Exercise:  Treadmill   Dental visits:  Last year - due   Seatbelt: yes     Concerns today:  Worries about everything   In neck, shoulder and arm on right burnign and tingling   Right elbow hurting him now too   Losing strength now also   Was given option of getting surgery   Rats his pain 8/10   Tingling, burning   Cannot sleep on his back     Was 2015 [facility]   Had extensive conversation with patient  MRI dated 2/10/15 personally reviewed  Multilevel spondylosis and stenosis  No definite intrinsic cord changes  No clear myelopathy on exam  From a pain point of view, have recommended PT and PM  We also spend a great deal of time discussing his stenosis  Risks, benefits of surgery vs no surgery discussed in great detail  Explained he theoretically carries a higher chance of severe neurological sequela, including death, should he sustain a significant neck injury  However, this hypothetical situation did not carry an absolute indication for surgery  He expressed full understanding of the above  He will f/u with [facility] regarding his left shoulder  He will proceed to obtain a second opinion regarding his cervical spondylosis  I have fully encouraged him to do so  F/U prn  He was full educated to call with any questions, concerns, worsening  MRI 2/11/2015 Impression:     Multilevel degenerative changes of the cervical spine  Worse at C3-4   where there is a central disc herniation which compresses the spinal   cord against the posterior elements  There is also some left sided   endplate ridging causing severe left neural foraminal stenosis  Question slight increased T2-weighted signal in the cord as discussed   above  Mild central canal stenosis at C4-5  Severe left neural foraminal stenosis at C5-6  Degenerative changes   as discussed above  Change in urinary stream, has to push it, has to run to the BR  Gets up often, urgency    Pain in back with coughing           The following portions of the patient's history were reviewed and updated as appropriate: allergies, current medications, past family history, past medical history, past social history, past surgical history and problem list      Current Medications:  Current Outpatient Medications   Medication Sig Dispense Refill    escitalopram (LEXAPRO) 10 mg tablet Take 1 tablet (10 mg total) by mouth daily 90 tablet 1    gabapentin (Neurontin) 300 mg capsule Take 1 capsule (300 mg total) by mouth daily at bedtime Increase as directed 90 capsule 1    Melatonin 10 MG TABS Take by mouth daily at bedtime      Multiple Vitamin (MULTIVITAMIN ADULT PO) Take 1 tablet by mouth in the morning      Omega-3 Fatty Acids (FISH OIL PO) Take 1 capsule by mouth in the morning      Cholecalciferol (Vitamin D) 50 MCG (2000 UT) CAPS Take 1 capsule (2,000 Units total) by mouth daily Over the counter       No current facility-administered medications for this visit  Objective:      /70   Pulse 88   Temp (!) 97 4 °F (36 3 °C) (Temporal)   Resp 16   Ht 5' 9 5" (1 765 m)   Wt 106 kg (233 lb)   SpO2 96%   BMI 33 91 kg/m²   BP Readings from Last 3 Encounters:   09/23/22 116/70   12/03/21 102/70   02/11/21 142/89     Wt Readings from Last 3 Encounters:   09/23/22 106 kg (233 lb)   12/03/21 112 kg (246 lb 11 2 oz)   02/11/21 103 kg (228 lb)       Review of Systems  ROS:  all others negative - no chest pain, SOB, normal bowels  Change in urine  no GERD  Not sleeping well due to pain  mood good  Physical Exam   Constitutional: he appears well-developed and well-nourished  HENT: Head: Normocephalic  Right Ear: External ear normal  Tympanic membrane normal    Left Ear: External ear normal  Tympanic membrane normal    Nose: Nose normal  No mucosal edema, No rhinorrhea  Right sinus exhibits no maxillary sinus tenderness  Left sinus exhibits no maxillary sinus tenderness  Mouth/Throat: Oropharynx is clear and moist    Eyes: Normal conjunctiva  No erythema  No discharge  Neck: No pain on exam  Neck supple  Cardiovascular: Normal rate, regular rhythm and normal heart sounds  Pulmonary/Chest: Effort normal and breath sounds normal  No wheezes  No rales  No rhonchi  Abdominal: Soft  Bowel sounds are normal  There is + left mild CVA tenderness  No rebound, rigidity, guarding     Musculoskeletal: he exhibits no edema  Decreased range of motion of cervical spine  Mild decreased range of motion of shoulder but with minimal pain and no pain on palpation  Lymphadenopathy: he has no cervical adenopathy  Neurological: he  is alert and oriented to person, place, and time  Skin: Skin is warm and dry  No rashes  Psychiatric: he  has a normal mood and affect  his behavior is normal  Thought content normal    Vitals reviewed  BMI Counseling: Body mass index is 33 91 kg/m²  The BMI is above normal  Nutrition recommendations include decreasing portion sizes  Exercise recommendations include exercising 3-5 times per week  Rationale for BMI follow-up plan is due to patient being overweight or obese

## 2022-09-24 PROBLEM — G89.29 CHRONIC RIGHT SHOULDER PAIN: Status: ACTIVE | Noted: 2022-09-24

## 2022-09-24 PROBLEM — M48.02 CERVICAL STENOSIS OF SPINAL CANAL: Status: ACTIVE | Noted: 2022-09-24

## 2022-09-24 PROBLEM — M25.511 CHRONIC RIGHT SHOULDER PAIN: Status: ACTIVE | Noted: 2022-09-24

## 2022-09-24 LAB — BACTERIA UR CULT: NORMAL

## 2022-09-25 NOTE — RESULT ENCOUNTER NOTE
Call patient with results - urine shows some protein and calcium crystals  This may be from a stone  I do want him to have a CT scan to look for a stone  I will place the order - please schedule this for him and let him know the results and when to get the scan  It does not need to be stat if they can get him scheduled in a reasonable time period 
never used

## 2022-10-28 ENCOUNTER — TELEPHONE (OUTPATIENT)
Dept: FAMILY MEDICINE CLINIC | Facility: CLINIC | Age: 47
End: 2022-10-28

## 2022-10-31 NOTE — TELEPHONE ENCOUNTER
Phone does not ring x 2, will need to try again at a different time  This is regarding lab results from September

## 2023-03-22 DIAGNOSIS — M48.02 CERVICAL STENOSIS OF SPINAL CANAL: ICD-10-CM

## 2023-03-22 RX ORDER — GABAPENTIN 300 MG/1
300 CAPSULE ORAL
Qty: 90 CAPSULE | Refills: 1 | Status: SHIPPED | OUTPATIENT
Start: 2023-03-22

## 2023-03-28 ENCOUNTER — TELEPHONE (OUTPATIENT)
Dept: OTHER | Facility: OTHER | Age: 48
End: 2023-03-28

## 2023-03-28 NOTE — TELEPHONE ENCOUNTER
Patient would like to schedule a consult  He would like an appt on a Friday, please call and leave a voicemail with appt date and time

## 2023-03-31 NOTE — TELEPHONE ENCOUNTER
2nd attempt: I called and LVM for patient to call back with more information on what he needs to be seen for

## 2023-04-27 DIAGNOSIS — F41.9 ANXIETY: ICD-10-CM

## 2023-04-27 RX ORDER — ESCITALOPRAM OXALATE 10 MG/1
TABLET ORAL
Qty: 30 TABLET | Refills: 0 | Status: SHIPPED | OUTPATIENT
Start: 2023-04-27

## 2023-04-28 ENCOUNTER — HOSPITAL ENCOUNTER (OUTPATIENT)
Dept: CT IMAGING | Facility: HOSPITAL | Age: 48
Discharge: HOME | End: 2023-04-28

## 2023-04-28 DIAGNOSIS — N20.0 KIDNEY STONE: ICD-10-CM

## 2023-05-26 ENCOUNTER — RA CDI HCC (OUTPATIENT)
Dept: OTHER | Facility: HOSPITAL | Age: 48
End: 2023-05-26

## 2023-05-26 NOTE — PROGRESS NOTES
[facility] [place] [facility]  coding opportunities       Chart reviewed, no opportunity found: CHART REVIEWED, NO OPPORTUNITY FOUND        Patients Insurance

## 2023-07-21 ENCOUNTER — APPOINTMENT (OUTPATIENT)
Dept: LAB | Facility: AMBULARY SURGERY CENTER | Age: 48
End: 2023-07-21
Payer: COMMERCIAL

## 2023-07-21 ENCOUNTER — OFFICE VISIT (OUTPATIENT)
Dept: UROLOGY | Facility: CLINIC | Age: 48
End: 2023-07-21
Payer: COMMERCIAL

## 2023-07-21 VITALS
HEIGHT: 70 IN | HEART RATE: 76 BPM | WEIGHT: 228 LBS | BODY MASS INDEX: 32.64 KG/M2 | DIASTOLIC BLOOD PRESSURE: 70 MMHG | SYSTOLIC BLOOD PRESSURE: 120 MMHG

## 2023-07-21 DIAGNOSIS — N13.8 BPH WITH OBSTRUCTION/LOWER URINARY TRACT SYMPTOMS: ICD-10-CM

## 2023-07-21 DIAGNOSIS — N40.1 BENIGN PROSTATIC HYPERPLASIA WITH NOCTURIA: Primary | ICD-10-CM

## 2023-07-21 DIAGNOSIS — N40.1 BPH WITH OBSTRUCTION/LOWER URINARY TRACT SYMPTOMS: ICD-10-CM

## 2023-07-21 DIAGNOSIS — R35.1 BENIGN PROSTATIC HYPERPLASIA WITH NOCTURIA: Primary | ICD-10-CM

## 2023-07-21 LAB — PSA SERPL-MCNC: 0.62 NG/ML (ref 0–4)

## 2023-07-21 PROCEDURE — 36415 COLL VENOUS BLD VENIPUNCTURE: CPT

## 2023-07-21 PROCEDURE — 99213 OFFICE O/P EST LOW 20 MIN: CPT | Performed by: PHYSICIAN ASSISTANT

## 2023-07-21 PROCEDURE — 84153 ASSAY OF PSA TOTAL: CPT

## 2023-07-21 NOTE — PROGRESS NOTES
UROLOGY PROGRESS NOTE   Patient Identifiers: Joe Ochoa (MRN [id number])  Date of Service: 7/21/2023    Subjective:   79-year-old man history of ureteroscopy in 2021. His urine showed small microscopic blood. He denies any flank pain. CT stone study showed nonobstructing left nephrolithiasis with decreased stone burden since prior exam.  PSA pending. I put him on Flomax in April which has been marginally effective. Reason for visit: Kidney stone follow-up    Objective:     VITALS:    There were no vitals filed for this visit. LABS:  Lab Results   Component Value Date    HGB 14.3 01/23/2021    HCT 41.0 01/23/2021    WBC 8.11 01/23/2021     01/23/2021   ]    Lab Results   Component Value Date    K 4.4 09/23/2022     09/23/2022    CO2 26 09/23/2022    BUN 25 09/23/2022    CREATININE 0.76 09/23/2022    CALCIUM 9.5 09/23/2022   ]        INPATIENT MEDS:    Current Outpatient Medications:   •  Cholecalciferol (Vitamin D) 50 MCG (2000 UT) CAPS, Take 1 capsule (2,000 Units total) by mouth daily Over the counter, Disp: , Rfl:   •  escitalopram (LEXAPRO) 10 mg tablet, TAKE 1 TABLET BY MOUTH EVERY DAY, Disp: 90 tablet, Rfl: 0  •  gabapentin (NEURONTIN) 300 mg capsule, TAKE 1 CAPSULE (300 MG TOTAL) BY MOUTH DAILY AT BEDTIME INCREASE AS DIRECTED, Disp: 90 capsule, Rfl: 1  •  Melatonin 10 MG TABS, Take by mouth daily at bedtime, Disp: , Rfl:   •  Multiple Vitamin (MULTIVITAMIN ADULT PO), Take 1 tablet by mouth in the morning, Disp: , Rfl:   •  Omega-3 Fatty Acids (FISH OIL PO), Take 1 capsule by mouth in the morning, Disp: , Rfl:   •  tamsulosin (FLOMAX) 0.4 mg, Take 1 capsule (0.4 mg total) by mouth daily with dinner, Disp: 90 capsule, Rfl: 3      Physical Exam:   There were no vitals taken for this visit.   GEN: no acute distress    RESP: breathing comfortably with no accessory muscle use    ABD: soft, non-tender, non-distended   INCISION:    EXT: no significant peripheral edema       RADIOLOGY: CT ABDOMEN AND PELVIS WITHOUT IV CONTRAST - LOW DOSE RENAL STONE      IMPRESSION:     1. Nonobstructing left nephrolithiasis with decreased stone burden since prior exam. No hydronephrosis. 2.  No new or acute findings in the abdomen/pelvis. Assessment:   #1. BPH  #2.   Nephrolithiasis    Plan:   -We talked about a cystoscopy TRUS and uroflow  -He will start with doubling his Flomax  -Check a PSA  -Follow-up in 3 months

## 2023-08-24 DIAGNOSIS — F41.9 ANXIETY: ICD-10-CM

## 2023-08-24 RX ORDER — ESCITALOPRAM OXALATE 10 MG/1
TABLET ORAL
Qty: 90 TABLET | Refills: 0 | Status: SHIPPED | OUTPATIENT
Start: 2023-08-24

## 2023-10-10 ENCOUNTER — OFFICE VISIT (OUTPATIENT)
Dept: FAMILY MEDICINE CLINIC | Facility: CLINIC | Age: 48
End: 2023-10-10
Payer: COMMERCIAL

## 2023-10-10 VITALS
BODY MASS INDEX: 34.97 KG/M2 | SYSTOLIC BLOOD PRESSURE: 114 MMHG | RESPIRATION RATE: 18 BRPM | TEMPERATURE: 97.9 F | OXYGEN SATURATION: 99 % | HEIGHT: 70 IN | WEIGHT: 244.25 LBS | HEART RATE: 91 BPM | DIASTOLIC BLOOD PRESSURE: 80 MMHG

## 2023-10-10 DIAGNOSIS — F33.1 MAJOR DEPRESSIVE DISORDER, RECURRENT EPISODE, MODERATE (HCC): ICD-10-CM

## 2023-10-10 DIAGNOSIS — R00.2 PALPITATIONS: ICD-10-CM

## 2023-10-10 DIAGNOSIS — Z00.00 WELL ADULT EXAM: Primary | ICD-10-CM

## 2023-10-10 DIAGNOSIS — R73.01 IFG (IMPAIRED FASTING GLUCOSE): ICD-10-CM

## 2023-10-10 DIAGNOSIS — Z12.11 SCREENING FOR COLON CANCER: ICD-10-CM

## 2023-10-10 DIAGNOSIS — F41.9 ANXIETY: ICD-10-CM

## 2023-10-10 DIAGNOSIS — E55.9 VITAMIN D DEFICIENCY: ICD-10-CM

## 2023-10-10 DIAGNOSIS — R06.83 SNORING: ICD-10-CM

## 2023-10-10 DIAGNOSIS — E78.1 HYPERTRIGLYCERIDEMIA: ICD-10-CM

## 2023-10-10 PROCEDURE — 99214 OFFICE O/P EST MOD 30 MIN: CPT | Performed by: FAMILY MEDICINE

## 2023-10-10 PROCEDURE — 99396 PREV VISIT EST AGE 40-64: CPT | Performed by: FAMILY MEDICINE

## 2023-10-10 NOTE — PROGRESS NOTES
Assessment/Plan:     1. Well adult exam    2. IFG (impaired fasting glucose)  Assessment & Plan:  Update fasting blood work     Orders:  -     Comprehensive metabolic panel; Future    3. Hypertriglyceridemia  Assessment & Plan:  Update fasting blood work     Orders:  -     Lipid panel; Future    4. Vitamin D deficiency  Assessment & Plan:  Update fasting blood work     Orders:  -     Vitamin D 25 hydroxy; Future    5. Major depressive disorder, recurrent episode, moderate (HCC)  Assessment & Plan:  Update fasting blood work       6. Anxiety  Assessment & Plan:  Well controlled       7. Screening for colon cancer  -     Cologuard    8. Palpitations  Comments:  Holter and echo ordered   Orders:  -     Holter monitor; Future; Expected date: 10/10/2023  -     Echo complete w/ contrast if indicated; Future; Expected date: 10/10/2023    9. Snoring  Comments:  sleep study ordered - suspect KELLY   Orders:  -     Home Study; Future       Will get a flu shot later in the season     Well adult exam  ·         Continue healthy diet   ·         Encourage exercise 4 times a week or more for minimum 30 minutes. ·         Continue to see dentist, wear seatbelt. ·         Health maintenance reviewed - Will get a flu shot later in the season, declines PCV20 due to severe reaction in the past. cologuard reordered. Following with urology. Reordered fasting blood work. Reviewed age appropriate health maintenance screenings and immunizations that are due, risks and benefits of these.    Health Maintenance   Topic Date Due   • Colorectal Cancer Screening  Never done   • COVID-19 Vaccine (4 - Pfizer series) 12/24/2021   • Influenza Vaccine (1) 09/01/2023   • Depression Remission PHQ  04/10/2024   • BMI: Followup Plan  10/10/2024   • BMI: Adult  10/10/2024   • Annual Physical  10/10/2024   • DTaP,Tdap,and Td Vaccines (3 - Td or Tdap) 01/14/2025   • HIV Screening  Completed   • Hepatitis C Screening  Completed   • Pneumococcal Vaccine: Pediatrics (0 to 5 Years) and At-Risk Patients (6 to 59 Years)  Aged Out   • HIB Vaccine  Aged Out   • IPV Vaccine  Aged Out   • Hepatitis A Vaccine  Aged Out   • Meningococcal ACWY Vaccine  Aged Out   • HPV Vaccine  Aged Out     Return in about 6 months (around 4/10/2024). Subjective:    MIRTA Pressley is a 51 y.o. male who presents today for a physical.     Chief Complaint   Patient presents with   • Physical Exam     Mood check   Patient states that he feels " his Heart skips  a beat and he get SOB at times"          Patient Care Team:  Shashi Prakash, DO as PCP - General (Family Medicine)    PHQ-2/9 Depression Screening    Little interest or pleasure in doing things: 1 - several days  Feeling down, depressed, or hopeless: 3 - nearly every day  Trouble falling or staying asleep, or sleeping too much: 0 - not at all  Feeling tired or having little energy: 3 - nearly every day  Poor appetite or overeating: 3 - nearly every day  Feeling bad about yourself - or that you are a failure or have let yourself or your family down: 0 - not at all  Trouble concentrating on things, such as reading the newspaper or watching television: 0 - not at all  Moving or speaking so slowly that other people could have noticed. Or the opposite - being so fidgety or restless that you have been moving around a lot more than usual: 0 - not at all  Thoughts that you would be better off dead, or of hurting yourself in some way: 0 - not at all  PHQ-9 Score: 10   PHQ-9 Interpretation: Moderate depression         ANJU-7 Flowsheet Screening    Flowsheet Row Most Recent Value   Over the last 2 weeks, how often have you been bothered by any of the following problems?     Feeling nervous, anxious, or on edge 0   Not being able to stop or control worrying 0   Worrying too much about different things 0   Trouble relaxing 0   Being so restless that it is hard to sit still 0   Becoming easily annoyed or irritable 0   Feeling afraid as if something awful might happen 0   ANJU-7 Total Score 0          ---Above per clinical staff & reviewed. ---  Patient here today for a physical:    Diet: not great   Exercise:  Not now - when he was no shortness of breath or coughing since vaping   Not sleeping well due to urinating     Concerns today:  Doing okay   Working 3rd shift   Getting up at least times a night   tamulosin helps just a little - then doubled medicine   Consider cystoscopy   Stones same   Enlarged prostate   getting retrograde ejaculation   Was on a diet but fell off of it   vapes - not smoking cigg - vapes a lot   Occasional heart skips a beat   Can go on for 30 minutes or so   Drinks a lot of caffeine           The following portions of the patient's history were reviewed and updated as appropriate: allergies, current medications, past family history, past medical history, past social history, past surgical history and problem list.     Current Medications:  Current Outpatient Medications   Medication Sig Dispense Refill   • Cholecalciferol (Vitamin D) 50 MCG (2000 UT) CAPS Take 1 capsule (2,000 Units total) by mouth daily Over the counter     • escitalopram (LEXAPRO) 10 mg tablet TAKE 1 TABLET BY MOUTH EVERY DAY 90 tablet 0   • gabapentin (NEURONTIN) 300 mg capsule TAKE 1 CAPSULE (300 MG TOTAL) BY MOUTH DAILY AT BEDTIME INCREASE AS DIRECTED 90 capsule 1   • Melatonin 10 MG TABS Take by mouth daily at bedtime     • Multiple Vitamin (MULTIVITAMIN ADULT PO) Take 1 tablet by mouth in the morning     • Omega-3 Fatty Acids (FISH OIL PO) Take 1 capsule by mouth in the morning     • tamsulosin (FLOMAX) 0.4 mg Take 1 capsule (0.4 mg total) by mouth daily with dinner 90 capsule 3     No current facility-administered medications for this visit.         Objective:      /80   Pulse 91   Temp 97.9 °F (36.6 °C)   Resp 18   Ht 5' 10" (1.778 m)   Wt 111 kg (244 lb 4 oz)   SpO2 99%   BMI 35.05 kg/m²   BP Readings from Last 3 Encounters:   10/10/23 114/80 07/21/23 120/70   04/14/23 118/72     Wt Readings from Last 3 Encounters:   10/10/23 111 kg (244 lb 4 oz)   07/21/23 103 kg (228 lb)   04/14/23 104 kg (230 lb)       Review of Systems  ROS:  all others negative - no chest pain, occasional SOB, + urinary frequency, normal bowels. no GERD. Not sleeping well. mood good. Physical Exam   Constitutional: he appears well-developed and well-nourished. HENT: Head: Normocephalic. Right Ear: External ear normal. Tympanic membrane normal.   Left Ear: External ear normal. Tympanic membrane normal.   Nose: Nose normal. No mucosal edema, No rhinorrhea. Right sinus exhibits no maxillary sinus tenderness. Left sinus exhibits no maxillary sinus tenderness. Mouth/Throat: Oropharynx is clear and moist.   Eyes: Normal conjunctiva. No erythema. No discharge. Neck: No pain on exam. Neck supple. Cardiovascular: Normal rate, regular rhythm and normal heart sounds. Pulmonary/Chest: Effort normal and breath sounds normal. No wheezes. No rales. No rhonchi. Abdominal: Soft. Bowel sounds are normal. There is no tenderness. Musculoskeletal: he exhibits no edema. Lymphadenopathy: he has no cervical adenopathy. Neurological: he  is alert and oriented to person, place, and time. Skin: Skin is warm and dry. No rashes. Psychiatric: he  has a normal mood and affect. his behavior is normal. Thought content normal.   Vitals reviewed. BMI Counseling: Body mass index is 35.05 kg/m². The BMI is above normal. Nutrition recommendations include decreasing portion sizes. Exercise recommendations include exercising 3-5 times per week. Rationale for BMI follow-up plan is due to patient being overweight or obese. Depression Screening and Follow-up Plan: Patient assessed for underlying major depression. Brief counseling provided and recommend additional follow-up/re-evaluation next office visit. Tobacco Cessation Counseling: Tobacco cessation counseling was provided. The patient is sincerely urged to quit consumption of tobacco. He is not ready to quit tobacco. Pt vapes. Not ready to quit. Does not want help.

## 2023-10-27 ENCOUNTER — TELEPHONE (OUTPATIENT)
Dept: SLEEP CENTER | Facility: CLINIC | Age: 48
End: 2023-10-27

## 2023-10-27 NOTE — TELEPHONE ENCOUNTER
----- Message from Anita Greenwood MD sent at 10/26/2023  7:54 PM EDT -----  approved  ----- Message -----  From: Lise Case: 10/20/2023   1:02 PM EDT  To: Sleep Medicine [facility] Provider    This home sleep study needs approval.     If approved please sign and return to clerical pool. If denied please include reasons why. Also provide alternative testing if warranted. Please sign and return to clerical pool.

## 2023-11-11 ENCOUNTER — APPOINTMENT (OUTPATIENT)
Dept: LAB | Facility: CLINIC | Age: 48
End: 2023-11-11
Payer: COMMERCIAL

## 2023-11-11 DIAGNOSIS — N13.8 BPH WITH OBSTRUCTION/LOWER URINARY TRACT SYMPTOMS: ICD-10-CM

## 2023-11-11 DIAGNOSIS — M48.02 CERVICAL STENOSIS OF SPINAL CANAL: ICD-10-CM

## 2023-11-11 DIAGNOSIS — R73.01 IFG (IMPAIRED FASTING GLUCOSE): ICD-10-CM

## 2023-11-11 DIAGNOSIS — E78.1 HYPERTRIGLYCERIDEMIA: ICD-10-CM

## 2023-11-11 DIAGNOSIS — E55.9 VITAMIN D DEFICIENCY: ICD-10-CM

## 2023-11-11 DIAGNOSIS — N40.1 BPH WITH OBSTRUCTION/LOWER URINARY TRACT SYMPTOMS: ICD-10-CM

## 2023-11-11 DIAGNOSIS — F41.9 ANXIETY: ICD-10-CM

## 2023-11-11 LAB
25(OH)D3 SERPL-MCNC: 23 NG/ML (ref 30–100)
ALBUMIN SERPL BCP-MCNC: 4.6 G/DL (ref 3.5–5)
ALP SERPL-CCNC: 76 U/L (ref 34–104)
ALT SERPL W P-5'-P-CCNC: 30 U/L (ref 7–52)
ANION GAP SERPL CALCULATED.3IONS-SCNC: 9 MMOL/L
AST SERPL W P-5'-P-CCNC: 22 U/L (ref 13–39)
BILIRUB SERPL-MCNC: 0.8 MG/DL (ref 0.2–1)
BUN SERPL-MCNC: 20 MG/DL (ref 5–25)
CALCIUM SERPL-MCNC: 10 MG/DL (ref 8.4–10.2)
CHLORIDE SERPL-SCNC: 104 MMOL/L (ref 96–108)
CHOLEST SERPL-MCNC: 230 MG/DL
CO2 SERPL-SCNC: 28 MMOL/L (ref 21–32)
CREAT SERPL-MCNC: 0.87 MG/DL (ref 0.6–1.3)
GFR SERPL CREATININE-BSD FRML MDRD: 101 ML/MIN/1.73SQ M
GLUCOSE P FAST SERPL-MCNC: 95 MG/DL (ref 65–99)
HDLC SERPL-MCNC: 45 MG/DL
LDLC SERPL CALC-MCNC: 139 MG/DL (ref 0–100)
NONHDLC SERPL-MCNC: 185 MG/DL
POTASSIUM SERPL-SCNC: 4.9 MMOL/L (ref 3.5–5.3)
PROT SERPL-MCNC: 7.8 G/DL (ref 6.4–8.4)
SODIUM SERPL-SCNC: 141 MMOL/L (ref 135–147)
TRIGL SERPL-MCNC: 230 MG/DL

## 2023-11-11 PROCEDURE — 80053 COMPREHEN METABOLIC PANEL: CPT

## 2023-11-11 PROCEDURE — 82306 VITAMIN D 25 HYDROXY: CPT

## 2023-11-11 PROCEDURE — 36415 COLL VENOUS BLD VENIPUNCTURE: CPT

## 2023-11-11 PROCEDURE — 80061 LIPID PANEL: CPT

## 2023-11-13 RX ORDER — GABAPENTIN 300 MG/1
300 CAPSULE ORAL
Qty: 90 CAPSULE | Refills: 1 | Status: SHIPPED | OUTPATIENT
Start: 2023-11-13

## 2023-11-13 RX ORDER — TAMSULOSIN HYDROCHLORIDE 0.4 MG/1
0.4 CAPSULE ORAL
Qty: 90 CAPSULE | Refills: 1 | Status: SHIPPED | OUTPATIENT
Start: 2023-11-13 | End: 2023-11-24 | Stop reason: SDUPTHER

## 2023-11-13 RX ORDER — ESCITALOPRAM OXALATE 10 MG/1
TABLET ORAL
Qty: 90 TABLET | Refills: 1 | Status: SHIPPED | OUTPATIENT
Start: 2023-11-13

## 2023-11-21 ENCOUNTER — HOSPITAL ENCOUNTER (OUTPATIENT)
Dept: SLEEP CENTER | Facility: CLINIC | Age: 48
Discharge: HOME | End: 2023-11-21
Payer: COMMERCIAL

## 2023-11-21 ENCOUNTER — HOSPITAL ENCOUNTER (OUTPATIENT)
Dept: NON INVASIVE DIAGNOSTICS | Facility: HOSPITAL | Age: 48
Discharge: HOME | End: 2023-11-21
Payer: COMMERCIAL

## 2023-11-21 DIAGNOSIS — R00.2 PALPITATIONS: ICD-10-CM

## 2023-11-21 DIAGNOSIS — R06.83 SNORING: ICD-10-CM

## 2023-11-21 PROCEDURE — 93225 XTRNL ECG REC<48 HRS REC: CPT

## 2023-11-21 PROCEDURE — 93226 XTRNL ECG REC<48 HR SCAN A/R: CPT

## 2023-11-24 ENCOUNTER — OFFICE VISIT (OUTPATIENT)
Dept: UROLOGY | Facility: CLINIC | Age: 48
End: 2023-11-24
Payer: COMMERCIAL

## 2023-11-24 ENCOUNTER — HOSPITAL ENCOUNTER (OUTPATIENT)
Dept: SLEEP CENTER | Facility: CLINIC | Age: 48
Discharge: HOME | End: 2023-11-24
Payer: COMMERCIAL

## 2023-11-24 VITALS
HEART RATE: 83 BPM | DIASTOLIC BLOOD PRESSURE: 88 MMHG | BODY MASS INDEX: 35.28 KG/M2 | WEIGHT: 246.4 LBS | OXYGEN SATURATION: 98 % | HEIGHT: 70 IN | SYSTOLIC BLOOD PRESSURE: 136 MMHG

## 2023-11-24 DIAGNOSIS — N13.8 BPH WITH OBSTRUCTION/LOWER URINARY TRACT SYMPTOMS: Primary | ICD-10-CM

## 2023-11-24 DIAGNOSIS — N40.1 BPH WITH OBSTRUCTION/LOWER URINARY TRACT SYMPTOMS: Primary | ICD-10-CM

## 2023-11-24 LAB — POST-VOID RESIDUAL VOLUME, ML POC: 13 ML

## 2023-11-24 PROCEDURE — 51798 US URINE CAPACITY MEASURE: CPT | Performed by: PHYSICIAN ASSISTANT

## 2023-11-24 PROCEDURE — 99213 OFFICE O/P EST LOW 20 MIN: CPT | Performed by: PHYSICIAN ASSISTANT

## 2023-11-24 PROCEDURE — G0399 HOME SLEEP TEST/TYPE 3 PORTA: HCPCS

## 2023-11-24 RX ORDER — TAMSULOSIN HYDROCHLORIDE 0.4 MG/1
0.8 CAPSULE ORAL
Qty: 180 CAPSULE | Refills: 3 | Status: SHIPPED | OUTPATIENT
Start: 2023-11-24

## 2023-11-24 NOTE — PROGRESS NOTES
UROLOGY PROGRESS NOTE   Patient Identifiers: Srinivasa Whitaker (MRN [id number])  Date of Service: 11/24/2023    Subjective:   40-year-old man with history of ureteroscopy in 2021. Prior CT showed nonobstructing stones on the left. He had increasing LUTS so he doubled his Flomax. PSA 0.62. Sleep study is pending. He sees some improvement with 0.8 of tamsulosin. Reason for visit: BPH and kidney stone follow-up    Objective:     VITALS:    There were no vitals filed for this visit. LABS:  Lab Results   Component Value Date    HGB 14.3 01/23/2021    HCT 41.0 01/23/2021    WBC 8.11 01/23/2021     01/23/2021   ]    Lab Results   Component Value Date    K 4.9 11/11/2023     11/11/2023    CO2 28 11/11/2023    BUN 20 11/11/2023    CREATININE 0.87 11/11/2023    CALCIUM 10.0 11/11/2023   ]        INPATIENT MEDS:    Current Outpatient Medications:     Cholecalciferol (Vitamin D) 50 MCG (2000 UT) CAPS, Take 1 capsule (2,000 Units total) by mouth daily Over the counter, Disp: , Rfl:     escitalopram (LEXAPRO) 10 mg tablet, TAKE 1 TABLET BY MOUTH EVERY DAY, Disp: 90 tablet, Rfl: 1    gabapentin (NEURONTIN) 300 mg capsule, TAKE 1 CAPSULE (300 MG TOTAL) BY MOUTH DAILY AT BEDTIME INCREASE AS DIRECTED, Disp: 90 capsule, Rfl: 1    Melatonin 10 MG TABS, Take by mouth daily at bedtime, Disp: , Rfl:     Multiple Vitamin (MULTIVITAMIN ADULT PO), Take 1 tablet by mouth in the morning, Disp: , Rfl:     Omega-3 Fatty Acids (FISH OIL PO), Take 1 capsule by mouth in the morning, Disp: , Rfl:     tamsulosin (FLOMAX) 0.4 mg, Take 1 capsule (0.4 mg total) by mouth daily with dinner, Disp: 90 capsule, Rfl: 1      Physical Exam:   There were no vitals taken for this visit. GEN: no acute distress    RESP: breathing comfortably with no accessory muscle use    ABD: soft, non-tender, non-distended   INCISION:    EXT: no significant peripheral edema     RADIOLOGY:   none     Assessment:   1. BPH  #2. Nephrolithiasis    Plan:   -Follow-up 1 year with PSA prior to visit  -Pending sleep study  -Cystoscopy TRUS uroflow for worsening symptoms  -

## 2023-11-27 PROBLEM — G47.33 OSA (OBSTRUCTIVE SLEEP APNEA): Status: ACTIVE | Noted: 2023-11-27

## 2023-11-27 PROCEDURE — 95806 SLEEP STUDY UNATT&RESP EFFT: CPT | Performed by: INTERNAL MEDICINE

## 2023-11-27 NOTE — PROGRESS NOTES
Home Sleep Study Documentation    HOME STUDY DEVICE: Noxturnal no                                           Helen G3 yes      Pre-Sleep Home Study:    Set-up and instructions performed by: -    Technician performed demonstration for Patient: yes    Return demonstration performed by Patient: yes    Written instructions provided to Patient: yes    Patient signed consent form: yes        Post-Sleep Home Study:    Additional comments by Patient: -    Home Sleep Study Failed:no:    Failure reason: N/A    Reported or Detected: N/A    Scored by: Josefina Bradley

## 2023-11-29 DIAGNOSIS — G47.33 OSA (OBSTRUCTIVE SLEEP APNEA): Primary | ICD-10-CM

## 2023-12-02 ENCOUNTER — PATIENT MESSAGE (OUTPATIENT)
Dept: FAMILY MEDICINE CLINIC | Facility: CLINIC | Age: 48
End: 2023-12-02

## 2023-12-02 DIAGNOSIS — E55.9 VITAMIN D DEFICIENCY: Primary | ICD-10-CM

## 2023-12-04 RX ORDER — ERGOCALCIFEROL 1.25 MG/1
50000 CAPSULE ORAL WEEKLY
Qty: 12 CAPSULE | Refills: 1 | Status: SHIPPED | OUTPATIENT
Start: 2023-12-04

## 2023-12-13 ENCOUNTER — OFFICE VISIT (OUTPATIENT)
Dept: SLEEP CENTER | Facility: CLINIC | Age: 48
End: 2023-12-13
Payer: COMMERCIAL

## 2023-12-13 VITALS
OXYGEN SATURATION: 97 % | BODY MASS INDEX: 36.08 KG/M2 | HEART RATE: 83 BPM | WEIGHT: 252 LBS | DIASTOLIC BLOOD PRESSURE: 90 MMHG | HEIGHT: 70 IN | SYSTOLIC BLOOD PRESSURE: 130 MMHG

## 2023-12-13 DIAGNOSIS — R40.0 DAYTIME SLEEPINESS: ICD-10-CM

## 2023-12-13 DIAGNOSIS — F41.9 ANXIETY AND DEPRESSION: ICD-10-CM

## 2023-12-13 DIAGNOSIS — E66.9 OBESITY (BMI 30-39.9): ICD-10-CM

## 2023-12-13 DIAGNOSIS — G47.33 OSA (OBSTRUCTIVE SLEEP APNEA): Primary | ICD-10-CM

## 2023-12-13 DIAGNOSIS — G47.9 SLEEP DISTURBANCE: ICD-10-CM

## 2023-12-13 DIAGNOSIS — F32.A ANXIETY AND DEPRESSION: ICD-10-CM

## 2023-12-13 DIAGNOSIS — F19.11 HISTORY OF SUBSTANCE ABUSE (HCC): ICD-10-CM

## 2023-12-13 PROCEDURE — 99204 OFFICE O/P NEW MOD 45 MIN: CPT | Performed by: INTERNAL MEDICINE

## 2023-12-13 NOTE — PATIENT INSTRUCTIONS
What you can do to improve your sleep: (Sleep Hygiene) Basic rules for a good night's sleep  Create a regular sleep schedule. This will help you form a sleep routine. Keep a record of your sleep patterns, and any sleeping problems you have. Bring the record to follow-up visits with healthcare providers. Avoid prolonged use of light-emitting screens before bedtime or watching TV in bed. Avoid forcing sleep. Do not take naps. Naps could make it hard for you to fall asleep at bedtime. Deal with your worries before bedtime. Keep your bedroom cool, quiet, and dark. Turn on white noise, such as a fan, to help you relax. Do not use your bed for any activity that will keep you awake. Do not read, exercise, eat, or watch TV in your bedroom. Get up if you do not fall asleep within 20 minutes. Move to another room and do something relaxing until you become sleepy. Limit caffeine, alcohol, nicotine and food to earlier in the day. Only drink caffeine in the morning. Do not drink alcohol within 6 hours of bedtime. Do not eat a heavy meal right before you go to bed. Avoid smoking, especially in the evening. Exercise regularly. Daily exercise will help you sleep better. Do not exercise within 4 hours of bedtime. Stimulus control therapy rules  1. Go to bed only when sleepy. 2. Do not watch television, read, eat, or worry while in bed. Use bed only for sleep and sex. 3. Get out of bed if unable to fall asleep within 20 minutes and go to another room. Return to bed only when sleepy. Repeat this step as many times as necessary throughout the night. 4. Set an alarm clock to wake up at a fixed time each morning, including weekends. 5. Do not take a nap during the day. Data from: [address], 1960 [facility] Nonpharmacologic treatments of insomnia. J Clin Psychiatry 2605; 53:37. Go to AASM website for more information: Sleepeducation. org  Recommended Reading: Book by blayne Salgado   No More sleepless nights    What is KELLY?   Obstructive sleep apnea is a common and serious sleep disorder that causes you to stop breathing during sleep. The airway repeatedly becomes blocked, limiting the amount of air that reaches your lungs. When this happens, you may snore loudly or making choking noises as you try to breathe. Your brain and body becomes oxygen deprived and you may wake up. This may happen a few times a night, or in more severe cases, several hundred times a night. Sleep apnea can make you wake up in the morning feeling tired or unrefreshed even though you have had a full night of sleep. During the day, you may feel fatigued, have difficulty concentrating or you may even unintentionally fall asleep. This is because your body is waking up numerous times throughout the night, even though you might not be conscious of each awakening. The lack of oxygen your body receives can have negative long-term consequences for your health. This includes:  High blood pressure  Heart disease  Irregular heart rhythms  Stroke  Pre-diabetes and diabetes  Depression  Testing  An objective evaluation of your sleep may be needed before your board certified sleep physician can make a diagnosis. Options include:   In-lab overnight sleep study  This type of sleep study requires you to stay overnight at a sleep center, in a bed that may resemble a hotel room. You will sleep with sensors hooked up to various parts of your body. These sensors record your brain waves, heartbeat, breathing and movement. An overnight sleep study also provides your doctor with the most complete information about your sleep. Learn more about an overnight sleep study. Home sleep apnea test  Some patients with high risk factors for obstructive sleep apnea and no other medical disorders may be candidates for a home sleep apnea test. The testing equipment differs in that it is less complicated than what is used in an overnight sleep study.  As such, does not give all the data an in-lab will and if negative, may not mean you do not have the problem. Treatment for sleep apnea includes using a continuous positive airway pressure (CPAP) machine to keep your airway open during sleep. A mask is placed over your nose and mouth, or just your nose. The mask is hooked to the CPAP machine that blows a gentle stream of air into the mask when you breathe. This helps keep your airway open so you can breathe more regularly. Extra oxygen may be given to you through the machine. You may be given a mouth device. It looks like a mouth guard or dental retainer and stops your tongue and mouth tissues from blocking your throat while you sleep. Surgery may be needed to remove extra tissues that block your mouth, throat, or nose. Manage sleep apnea:   Do not smoke. Nicotine and other chemicals in cigarettes and cigars can cause lung damage. Ask your healthcare provider for information if you currently smoke and need help to quit. E-cigarettes or smokeless tobacco still contain nicotine. Talk to your healthcare provider before you use these products. Do not drink alcohol or take sedative medicine before you go to sleep. Alcohol and sedatives can relax the muscles and tissues around your throat. This can block the airflow to your lungs. Maintain a healthy weight. Excess tissue around your throat may restrict your breathing. Ask your healthcare provider for information if you need to lose weight. Sleep on your side or use pillows designed to prevent sleep apnea. This prevents your tongue or other tissues from blocking your throat. You can also raise the head of your bed. Driving Safety. Refrain from driving when drowsy. Follow up with your healthcare provider as directed: Write down your questions so you remember to ask them during your visits. Go to AASM website for more information: Sleepeducation. org  What is KELLY?    Obstructive sleep apnea is a common and serious sleep disorder that causes you to stop breathing during sleep. The airway repeatedly becomes blocked, limiting the amount of air that reaches your lungs. When this happens, you may snore loudly or making choking noises as you try to breathe. Your brain and body becomes oxygen deprived and you may wake up. This may happen a few times a night, or in more severe cases, several hundred times a night. Sleep apnea can make you wake up in the morning feeling tired or unrefreshed even though you have had a full night of sleep. During the day, you may feel fatigued, have difficulty concentrating or you may even unintentionally fall asleep. This is because your body is waking up numerous times throughout the night, even though you might not be conscious of each awakening. The lack of oxygen your body receives can have negative long-term consequences for your health. This includes:  High blood pressure  Heart disease  Irregular heart rhythms  Stroke  Pre-diabetes and diabetes  Depression  Testing  An objective evaluation of your sleep may be needed before your board certified sleep physician can make a diagnosis. Options include:   In-lab overnight sleep study  This type of sleep study requires you to stay overnight at a sleep center, in a bed that may resemble a hotel room. You will sleep with sensors hooked up to various parts of your body. These sensors record your brain waves, heartbeat, breathing and movement. An overnight sleep study also provides your doctor with the most complete information about your sleep. Learn more about an overnight sleep study. Home sleep apnea test  Some patients with high risk factors for obstructive sleep apnea and no other medical disorders may be candidates for a home sleep apnea test. The testing equipment differs in that it is less complicated than what is used in an overnight sleep study. As such, does not give all the data an in-lab will and if negative, may not mean you do not have the problem.   Treatment for sleep apnea includes using a continuous positive airway pressure (CPAP) machine to keep your airway open during sleep. A mask is placed over your nose and mouth, or just your nose. The mask is hooked to the CPAP machine that blows a gentle stream of air into the mask when you breathe. This helps keep your airway open so you can breathe more regularly. Extra oxygen may be given to you through the machine. You may be given a mouth device. It looks like a mouth guard or dental retainer and stops your tongue and mouth tissues from blocking your throat while you sleep. Surgery may be needed to remove extra tissues that block your mouth, throat, or nose. Manage sleep apnea:   Do not smoke. Nicotine and other chemicals in cigarettes and cigars can cause lung damage. Ask your healthcare provider for information if you currently smoke and need help to quit. E-cigarettes or smokeless tobacco still contain nicotine. Talk to your healthcare provider before you use these products. Do not drink alcohol or take sedative medicine before you go to sleep. Alcohol and sedatives can relax the muscles and tissues around your throat. This can block the airflow to your lungs. Maintain a healthy weight. Excess tissue around your throat may restrict your breathing. Ask your healthcare provider for information if you need to lose weight. Sleep on your side or use pillows designed to prevent sleep apnea. This prevents your tongue or other tissues from blocking your throat. You can also raise the head of your bed. Driving Safety. Refrain from driving when drowsy. Follow up with your healthcare provider as directed: Write down your questions so you remember to ask them during your visits. Go to AASM website for more information: Sleepeducation. org    Nursing Support:  When: Monday through Friday 7A-5PM except holidays  Where: Our direct line is [phone]. If you are having a true emergency please call 911.   In the event that the line is busy or it is after hours please leave a voice message and we will return your call. Please speak clearly, leaving your full name, birth date, best number to reach you and the reason for your call. Medication refills: We will need the name of the medication, the dosage, the ordering provider, whether you get a 30 or 90 day refill, and the pharmacy name and address. Medications will be ordered by the provider only. Nurses cannot call in prescriptions. Please allow 7 days for medication refills. Physician requested updates: If your provider requested that you call with an update after starting medication, please be ready to provide us the medication and dosage, what time you take your medication, the time you attempt to fall asleep, time you fall asleep, when you wake up, and what time you get out of bed. Sleep Study Results: We will contact you with sleep study results and/or next steps after the physician has reviewed your testing.

## 2023-12-13 NOTE — PROGRESS NOTES
Consultation - [address] [place] [address]  51 y.o. male  :1975  NP  DOS:2023    Physician Requesting Consult: Leif Wallace DO             Reason for Consult : At your kind request I saw Angelia Best for initial sleep evaluation today. Home sleep testing was undertaken to evaluate for sleep disordered breathing and patient is here to review results and further options. The study demonstrated a respiratory event index (TARIK) of 8.3. The lowest SpO2 recorded is 81%. Oxygen waldo of 81% and 6.9 minutes during the study was spent with saturations below 90%. PFSH, Problem List, Medications & Allergies were reviewed in EMR. Aniyah Shah  has a past medical history of Depression, Elevated liver enzymes, Heartburn, History of alcohol abuse, History of drug abuse ([address]), Hypertriglyceridemia, Kidney stone, Obesity, Recurrent depression ([address]), and Vitamin D deficiency. He has a current medication list which includes the following prescription(s): vitamin d, ergocalciferol, escitalopram, gabapentin, melatonin, multiple vitamin, omega-3 fatty acids, and tamsulosin. HPI: His study was undertaken for his complaint of "unable to sleep for night "of at least 5 years duration. Wife reports loud snoring and has witnessed apneas. Aniyah Shah has had several episodes of awakening with choking "and thought I was going to die ". .  Other complaints: Has acid reflux. Marvin Speaker Restless Leg Syndrome: reports no suggestive symptoms. Parasomnia: no features reported    Sleep Routine (averaged): Typical Bedtime: 9 AM.  Gets OOB: noon. TIB:3 hrs. Sleep latency:< 15 minutes Sleep Interruptions: 3-4,  because of nocturia and struggles to fall back asleep. Awakens: Spontaneously  Upon awakening: never feels rested. He estimates getting 4hrs sleep which includes an hour snack prior to his work shift.   Daytime Function:Stanley reports daytime sleepiness feels like napping & does when has the opportunity on weekends " when I get most of my sleep ". . He rated himself at Total score: 6 /24 on the [place] Sleepiness Scale. Habits:   reports that he quit smoking about 7 years ago. His smoking use included cigarettes. He started smoking about 35 years ago. He has a 60.0 pack-year smoking history. He uses smokeless tobacco.;  reports that he does not currently use alcohol.; Reports that he does not currently use drugs. ;  E-Cigarette Use - Current Every Day User. Comments: use bottle of "juice", nicotine; Caffeine use:very little; Exercise routine: "not enough". He has a history of polysubstance abuse and addictions but states has been dry for several years. Occupation:     Family History: Mother had obstructive sleep apnea  ROS: Significant for around 20 pounds weight gain in the past year. Tasneem Lavender He reported no nasal or respiratory symptoms. He was experiencing irregular heart rhythm until he stopped drinking red bull. He has work-related stress and feelings of anxiety. Depression is controlled on Lexapro. He uses gabapentin for cervical radicular symptoms. EXAM:  /90   Pulse 83   Ht 5' 10" (1.778 m)   Wt 114 kg (252 lb)   SpO2 97%   BMI 36.16 kg/m²    General: Well groomed male, tired appearing, in no apparent distress. Neurological: Alert and orientated; cooperative; Cranial nerves intact;    Psychiatric: Speech: Clear and coherent; normal mood, affect & thought   Skin: Warm and dry; Color& Hydration good; no facial rashes or lesions   HEENT:  Craniofacial anatomy: normal Sinuses: Non-tender. TMJ: Normal    Eyes: EOM's intact; conjunctiva/corneas clear   Ears: Appear normal     Nasal Airway: is patent Septum: Intact;  Turbinates: Normal; Rhinorrhea: None  Mouth: Lips: Normal posture; Dentition: normal . Mucosa: Moist; Hard Palate:normal    Oropharryx: crowded and AP narrowing Tongue: Mallampati:Class IV and MobileSoft Palate:  redundant  and Uvular Hypertrophy Tonsils: absent  Neck: Is thick; neck Circumference: 18 "; supple; no abnormal masses; Thyroid: Normal. Trachea: Central.    Lymph: No cervical or submandibular Lymhadenopathy  Heart: S1,S2 normal; RRR; no gallop; no murmur   Lungs: Respiratory Effort: Normal. Air entry good bilaterally. No wheezes. No rales  Abdomen: Obese, soft & non-tender    Extremities: No pedal edema. No clubbing or cyanosis. Musculoskeletal:  Motor normal; Gait: Normal.       IMPRESSION: Primary/Secondary Sleep Diagnoses (to Medical or Psych conditions) & Comorbidities   1. KELLY (obstructive sleep apnea)  Ambulatory referral to Sleep Medicine    Cpap DME      2. Sleep disturbance        3. Daytime sleepiness        4. Anxiety and depression        5. History of substance abuse ([address])        6. Obesity (BMI 30-39. 9)             PLAN:   1. I reviewed results of the Sleep studies with the patient. 2. With respect to above conditions, I counseled on pathophysiology, diagnosis, treatment options, risks and benefits; inter-relationship and effects on symptoms and comorbidities; risks of no treatment; costs and insurance aspects. 3. Patient elected positive airway pressure therapy and care coordinated with the DME provider for set-up. 4. Need for compliance with therapy and weight reduction were emphasized. 5.  Multi component Cognitive behavioral therapy for Insomnia undertaken - Sleep Restriction, Stimulus control, Relaxation techniques and Sleep hygiene were discussed. 6. He prefers to continue working third shift. I discussed strategies to cope with shiftwork. 7.  I suggested trial of duloxetine in place of Lexapro and Neurontin but he is reticent. 8. Follow-up to be scheduled in 2 months to monitor compliance, progress and to adjust therapy. Thank you for allowing me to participate in the care of this patient. I will keep you apprised of developments.     Sincerely,      Authenticated electronically on 08/67/37   Board Certified Specialist     Portions of the record may have been created with voice recognition software. Occasional wrong word or "sound a like" substitutions may have occurred due to the inherent limitations of voice recognition software. There may also be notations and random deletions of words or characters from malfunctioning software. Read the chart carefully and recognize, using context, where substitutions/deletions have occurred.

## 2023-12-15 ENCOUNTER — HOSPITAL ENCOUNTER (OUTPATIENT)
Dept: NON INVASIVE DIAGNOSTICS | Facility: CLINIC | Age: 48
Discharge: HOME | End: 2023-12-15
Payer: COMMERCIAL

## 2023-12-15 VITALS
HEIGHT: 70 IN | SYSTOLIC BLOOD PRESSURE: 130 MMHG | BODY MASS INDEX: 35.98 KG/M2 | WEIGHT: 251.32 LBS | DIASTOLIC BLOOD PRESSURE: 90 MMHG | HEART RATE: 63 BPM

## 2023-12-15 DIAGNOSIS — R00.2 PALPITATIONS: ICD-10-CM

## 2023-12-15 LAB
AORTIC ROOT: 3.5 CM
APICAL FOUR CHAMBER EJECTION FRACTION: 63 %
ASCENDING AORTA: 3.6 CM
E WAVE DECELERATION TIME: 218 MS
E/A RATIO: 0.81
FRACTIONAL SHORTENING: 37 (ref 28–44)
INTERVENTRICULAR SEPTUM IN DIASTOLE (PARASTERNAL SHORT AXIS VIEW): 1.1 CM
INTERVENTRICULAR SEPTUM: 1.1 CM (ref 0.6–1.1)
LAAS-AP2: 17.6 CM2
LAAS-AP4: 13.8 CM2
LEFT ATRIUM AREA SYSTOLE SINGLE PLANE A4C: 14.7 CM2
LEFT ATRIUM SIZE: 3.9 CM
LEFT ATRIUM VOLUME (MOD BIPLANE): 40 ML
LEFT ATRIUM VOLUME INDEX (MOD BIPLANE): 17.4 ML/M2
LEFT INTERNAL DIMENSION IN SYSTOLE: 3.4 CM (ref 2.1–4)
LEFT VENTRICULAR INTERNAL DIMENSION IN DIASTOLE: 5.4 CM (ref 3.5–6)
LEFT VENTRICULAR POSTERIOR WALL IN END DIASTOLE: 1.1 CM
LEFT VENTRICULAR STROKE VOLUME: 92 ML
LVSV (TEICH): 92 ML
MV E'TISSUE VEL-SEP: 8 CM/S
MV PEAK A VEL: 0.85 M/S
MV PEAK E VEL: 69 CM/S
MV STENOSIS PRESSURE HALF TIME: 63 MS
MV VALVE AREA P 1/2 METHOD: 3.49
RIGHT ATRIUM AREA SYSTOLE A4C: 11.6 CM2
RIGHT VENTRICLE ID DIMENSION: 3.4 CM
SL CV LEFT ATRIUM LENGTH A2C: 5 CM
SL CV LV EF: 60
SL CV PED ECHO LEFT VENTRICLE DIASTOLIC VOLUME (MOD BIPLANE) 2D: 141 ML
SL CV PED ECHO LEFT VENTRICLE SYSTOLIC VOLUME (MOD BIPLANE) 2D: 49 ML
TR MAX PG: 22 MMHG
TR PEAK VELOCITY: 2.3 M/S
TRICUSPID ANNULAR PLANE SYSTOLIC EXCURSION: 2.7 CM
TRICUSPID VALVE PEAK REGURGITATION VELOCITY: 2.34 M/S

## 2023-12-15 PROCEDURE — 93306 TTE W/DOPPLER COMPLETE: CPT | Performed by: INTERNAL MEDICINE

## 2023-12-15 PROCEDURE — 93306 TTE W/DOPPLER COMPLETE: CPT

## 2023-12-18 ENCOUNTER — TELEPHONE (OUTPATIENT)
Dept: SLEEP CENTER | Facility: CLINIC | Age: 48
End: 2023-12-18

## 2023-12-18 NOTE — TELEPHONE ENCOUNTER
12/28/23 set up Alfred    Rx and clinicals for DME setup sent to [facility] [employer] via [employer].

## 2023-12-19 LAB

## 2023-12-28 LAB

## 2024-01-19 ENCOUNTER — TELEPHONE (OUTPATIENT)
Dept: SLEEP CENTER | Facility: CLINIC | Age: 49
End: 2024-01-19

## 2024-01-19 NOTE — TELEPHONE ENCOUNTER
Returned the patient's call.   He reports that he is scheduled for a mask fitting oat the [facility]

## 2024-04-05 ENCOUNTER — RA CDI HCC (OUTPATIENT)
Dept: OTHER | Facility: HOSPITAL | Age: 49
End: 2024-04-05

## 2024-04-05 NOTE — PROGRESS NOTES
HCC coding opportunities       Chart reviewed, no opportunity found: CHART REVIEWED, NO OPPORTUNITY FOUND        Patients Insurance        Commercial Insurance: [employer]

## 2024-04-12 ENCOUNTER — OFFICE VISIT (OUTPATIENT)
Dept: FAMILY MEDICINE CLINIC | Facility: CLINIC | Age: 49
End: 2024-04-12

## 2024-04-12 ENCOUNTER — TELEPHONE (OUTPATIENT)
Age: 49
End: 2024-04-12

## 2024-04-12 VITALS
RESPIRATION RATE: 16 BRPM | TEMPERATURE: 99.7 F | SYSTOLIC BLOOD PRESSURE: 126 MMHG | HEIGHT: 70 IN | HEART RATE: 92 BPM | BODY MASS INDEX: 34.19 KG/M2 | DIASTOLIC BLOOD PRESSURE: 74 MMHG | OXYGEN SATURATION: 96 % | WEIGHT: 238.8 LBS

## 2024-04-12 DIAGNOSIS — F41.9 ANXIETY: Primary | ICD-10-CM

## 2024-04-12 DIAGNOSIS — R73.01 IFG (IMPAIRED FASTING GLUCOSE): ICD-10-CM

## 2024-04-12 DIAGNOSIS — E66.09 CLASS 1 OBESITY DUE TO EXCESS CALORIES WITH SERIOUS COMORBIDITY AND BODY MASS INDEX (BMI) OF 34.0 TO 34.9 IN ADULT: ICD-10-CM

## 2024-04-12 DIAGNOSIS — E55.9 VITAMIN D DEFICIENCY: ICD-10-CM

## 2024-04-12 DIAGNOSIS — F33.1 MAJOR DEPRESSIVE DISORDER, RECURRENT EPISODE, MODERATE (HCC): ICD-10-CM

## 2024-04-12 DIAGNOSIS — M48.02 CERVICAL STENOSIS OF SPINAL CANAL: ICD-10-CM

## 2024-04-12 DIAGNOSIS — L40.9 PSORIASIS: ICD-10-CM

## 2024-04-12 DIAGNOSIS — Z12.5 SCREENING PSA (PROSTATE SPECIFIC ANTIGEN): ICD-10-CM

## 2024-04-12 DIAGNOSIS — Z12.11 SCREENING FOR COLON CANCER: ICD-10-CM

## 2024-04-12 DIAGNOSIS — Z23 ENCOUNTER FOR IMMUNIZATION: ICD-10-CM

## 2024-04-12 DIAGNOSIS — G89.29 OTHER CHRONIC PAIN: ICD-10-CM

## 2024-04-12 DIAGNOSIS — G89.29 CHRONIC RIGHT SHOULDER PAIN: ICD-10-CM

## 2024-04-12 DIAGNOSIS — E78.1 HYPERTRIGLYCERIDEMIA: ICD-10-CM

## 2024-04-12 DIAGNOSIS — M25.511 CHRONIC RIGHT SHOULDER PAIN: ICD-10-CM

## 2024-04-12 DIAGNOSIS — L21.9 SEBORRHEIC DERMATITIS: ICD-10-CM

## 2024-04-12 DIAGNOSIS — M47.812 CERVICAL SPONDYLOSIS WITHOUT MYELOPATHY: ICD-10-CM

## 2024-04-12 RX ORDER — KETOCONAZOLE 20 MG/G
CREAM TOPICAL DAILY
Qty: 45 G | Refills: 2 | Status: SHIPPED | OUTPATIENT
Start: 2024-04-12

## 2024-04-12 RX ORDER — BENZOCAINE/MENTHOL 6 MG-10 MG
LOZENGE MUCOUS MEMBRANE 2 TIMES DAILY PRN
Qty: 15 G | Refills: 0 | Status: SHIPPED | OUTPATIENT
Start: 2024-04-12

## 2024-04-12 RX ORDER — CLOBETASOL PROPIONATE 0.5 MG/G
OINTMENT TOPICAL 2 TIMES DAILY
Qty: 60 G | Refills: 2 | Status: SHIPPED | OUTPATIENT
Start: 2024-04-12

## 2024-04-12 RX ORDER — GABAPENTIN 300 MG/1
300-600 CAPSULE ORAL
Qty: 180 CAPSULE | Refills: 1 | Status: SHIPPED | OUTPATIENT
Start: 2024-04-12

## 2024-04-12 NOTE — TELEPHONE ENCOUNTER
I recommended [facility] - on after visit summary       [address] [facility], [address]  [place] [id number] [place] [address] [phone]

## 2024-04-12 NOTE — PROGRESS NOTES
1. Anxiety  Assessment & Plan:  Stable.       2. Psoriasis  -     Ambulatory referral to Dermatology; Future  -     clobetasol (TEMOVATE) 0.05 % ointment; Apply topically 2 (two) times a day 2 weeks on, 2 weeks off to knee    3. Seborrheic dermatitis  -     ketoconazole (NIZORAL) 2 % cream; Apply topically daily To face rash  -     hydrocortisone 1 % cream; Apply topically 2 (two) times a day as needed for irritation or rash No longer than 2 weeks.for itching on face and ears    4. Cervical stenosis of spinal canal  Assessment & Plan:  Pt has not had Neurontin for some time. Was helping some and was helping sleep. No side effects.     Orders:  -     gabapentin (NEURONTIN) 300 mg capsule; Take 1-2 capsules (300-600 mg total) by mouth daily at bedtime Increase as directed    5. Other chronic pain  -     gabapentin (NEURONTIN) 300 mg capsule; Take 1-2 capsules (300-600 mg total) by mouth daily at bedtime Increase as directed    6. Encounter for immunization    7. Screening for colon cancer  -     Ambulatory Referral to Gastroenterology; Future  -     Vitamin D 25 hydroxy; Future; Expected date: 10/12/2024    8. Vitamin D deficiency  Assessment & Plan:  Update labs       9. Major depressive disorder, recurrent episode, moderate (HCC)  Assessment & Plan:  Stable       10. Class 1 obesity due to excess calories with serious comorbidity and body mass index (BMI) of 34.0 to 34.9 in adult  Assessment & Plan:  Encouraged diet and exercise to help avoid weight related complications.       11. Screening PSA (prostate specific antigen)  -     PSA, Total Screen; Future; Expected date: 10/12/2024    12. IFG (impaired fasting glucose)  Assessment & Plan:  Update labs prior to next visit     Orders:  -     Comprehensive metabolic panel; Future; Expected date: 10/12/2024  -     Hemoglobin A1C; Future; Expected date: 10/12/2024    13. Hypertriglyceridemia  Assessment & Plan:  Update labs prior to Nov appointment     Orders:  -      Lipid panel; Future; Expected date: 10/12/2024  -     Hemoglobin A1C; Future; Expected date: 10/12/2024    14. Cervical spondylosis without myelopathy    15. Chronic right shoulder pain  Assessment & Plan:  Refer to ortho            Return in about 6 months (around 10/12/2024) for Annual physical.    Subjective:   Stanley is a 48 y.o. male here today for a follow-up on his current medical conditions:    Patient Active Problem List   Diagnosis   • History of kidney stones   • Anxiety   • Cervical spondylosis without myelopathy   • ED (erectile dysfunction)   • Elevated liver enzymes   • History of alcohol abuse   • History of drug abuse (HCC)   • Hypertriglyceridemia   • IFG (impaired fasting glucose)   • Low libido   • Major depressive disorder, recurrent episode, moderate (HCC)   • Class 1 obesity due to excess calories with serious comorbidity and body mass index (BMI) of 34.0 to 34.9 in adult   • Vitamin D deficiency   • Diverticulosis   • Cervical stenosis of spinal canal   • Chronic right shoulder pain   • KELLY (obstructive sleep apnea)   • Psoriasis       Patient Care Team:  Felisa Munoz DO as PCP - General (Family Medicine)    Current Medications:  Current Outpatient Medications   Medication Sig Dispense Refill   • Cholecalciferol (Vitamin D) 50 MCG (2000 UT) CAPS Take 1 capsule (2,000 Units total) by mouth daily Over the counter     • clobetasol (TEMOVATE) 0.05 % ointment Apply topically 2 (two) times a day 2 weeks on, 2 weeks off to knee 60 g 2   • ergocalciferol (VITAMIN D2) 50,000 units Take 1 capsule (50,000 Units total) by mouth once a week 12 capsule 1   • escitalopram (LEXAPRO) 10 mg tablet TAKE 1 TABLET BY MOUTH EVERY DAY 90 tablet 1   • gabapentin (NEURONTIN) 300 mg capsule Take 1-2 capsules (300-600 mg total) by mouth daily at bedtime Increase as directed 180 capsule 1   • hydrocortisone 1 % cream Apply topically 2 (two) times a day as needed for irritation or rash No longer than 2 weeks.for  itching on face and ears 15 g 0   • ketoconazole (NIZORAL) 2 % cream Apply topically daily To face rash 45 g 2   • Multiple Vitamin (MULTIVITAMIN ADULT PO) Take 1 tablet by mouth in the morning     • Omega-3 Fatty Acids (FISH OIL PO) Take 1 capsule by mouth in the morning     • tamsulosin (FLOMAX) 0.4 mg Take 2 capsules (0.8 mg total) by mouth daily with dinner 180 capsule 3     No current facility-administered medications for this visit.       HPI:  Chief Complaint   Patient presents with   • Follow-up     6 month follow up.  NO VACCINES TODAY.  Would like a colonoscopy.   • Shoulder Pain     Left shoulder pain with sharp pains. Patient states both shoulders are in pain, but left is worse. Patient states pain as being a 6/10     -- Above per clinical staff and reviewed. --    PHQ-2/9 Depression Screening    Little interest or pleasure in doing things: 0 - not at all  Feeling down, depressed, or hopeless: 0 - not at all  Trouble falling or staying asleep, or sleeping too much: 3 - nearly every day  Feeling tired or having little energy: 3 - nearly every day  Poor appetite or overeatin - not at all  Feeling bad about yourself - or that you are a failure or have let yourself or your family down: 0 - not at all  Trouble concentrating on things, such as reading the newspaper or watching television: 1 - several days  Moving or speaking so slowly that other people could have noticed. Or the opposite - being so fidgety or restless that you have been moving around a lot more than usual: 0 - not at all  Thoughts that you would be better off dead, or of hurting yourself in some way: 0 - not at all  PHQ-9 Score: 7  PHQ-9 Interpretation: Mild depression       ANJU-7 Flowsheet Screening    Flowsheet Row Most Recent Value   Over the last 2 weeks, how often have you been bothered by any of the following problems?    Feeling nervous, anxious, or on edge 1   Not being able to stop or control worrying 1   Worrying too much about  "different things 1   Trouble relaxing 1   Being so restless that it is hard to sit still 0   Becoming easily annoyed or irritable 0   Feeling afraid as if something awful might happen 0   ANJU-7 Total Score 4           PE due  10/10/24    Vit D   Blood work due 11/11/24 lipids cmp, vit D   7/21/24 PSA   Persistent hematuria  - saw urology 11/2023 ureteroscopy in 2021.  Prior CT showed nonobstructing stones on the left.  Sleep medicine recommends switch to cymbalta inst  ead of lexapro and neurontin CT urogram non-obstructing stone  lipids cmp vit d   due for lipids ?TSH - ordered ?PSA  restarted Lexapro 2/11/21 - labs ordered Feb 2021   dec 2021 labs chol 200, , HDL 40, , TSH 1.880   Today:  Rates his shoulder pain 7/10 - bad pain   Pain with movement, cannot sleep on it - left worse   Will wake him up  Sharp pain   Right 4th and 5th fingers numb. Repeated gripping especially   Falls asleep  Burning sensation   Few years ago Dr. Crow Peng helped for a while   Psoriasis few years     Cannot sleep well   Gets home 7 am   Weekends different hours   Doxepin made him too tired       The following portions of the patient's history were reviewed and updated as appropriate: allergies, current medications, past family history, past medical history, past social history, past surgical history and problem list.    Objective:  Vitals:  /74 (BP Location: Left arm, Patient Position: Sitting, Cuff Size: Standard)   Pulse 92   Temp 99.7 °F (37.6 °C) (Temporal)   Resp 16   Ht 5' 10\" (1.778 m)   Wt 108 kg (238 lb 12.8 oz)   SpO2 96%   BMI 34.26 kg/m²    Wt Readings from Last 3 Encounters:   04/12/24 108 kg (238 lb 12.8 oz)   12/15/23 114 kg (251 lb 5.2 oz)   12/13/23 114 kg (252 lb)      BP Readings from Last 3 Encounters:   04/12/24 126/74   12/15/23 130/90   12/13/23 130/90        Review of Systems   He has no other concerns. No unexpected weight changes. No chest pain, SOB, or palpitations. No " GERD. No changes in bowels or bladder. Not sleeping well. No mood changes.     Physical Exam   Constitutional:  he appears well-developed and well-nourished.  HENT: Head: Normocephalic.   Neck: Neck supple.   Cardiovascular: Normal rate, regular rhythm and normal heart sounds.   Pulmonary/Chest: Effort normal and breath sounds normal. No wheezes, rales, or rhonchi.   Abdominal: Soft. Bowel sounds are normal. There is no tenderness. No hepatosplenomegaly.   Musculoskeletal: he exhibits no edema.   Lymphadenopathy: he has no cervical adenopathy.   Neurological: he is alert and oriented to person, place, and time.   Skin: Skin is warm and dry. Bilateral knees bilateral erythematous, lichenified skin with white scaling.   Psychiatric: he has a normal mood and affect. his behavior is normal. Thought content normal.       Depression Screening and Follow-up Plan: Patient's depression screening was positive with a PHQ-9 score of 7. Patient with underlying depression and was advised to continue current medications as prescribed.     Tobacco Cessation Counseling: Tobacco cessation counseling was provided. The patient is sincerely urged to quit consumption of tobacco. He is not ready to quit tobacco. Medication options and side effects of medication discussed. Patient refused medication.

## 2024-04-12 NOTE — TELEPHONE ENCOUNTER
Pt. Spouse called to request Ortho referral for shoulder pain.She did not receive any information about where she can take the Pt. Please Fax referral to Pt. Fax # is [phone].  Thank you!!

## 2024-04-12 NOTE — PATIENT INSTRUCTIONS
[facility] :     [address] [facility], [place] [address] [place], [place] [id number] [phone]       Once a week use Neutrogena T-Gel extra strength.  Apply to wet scalp.  Massage into scalp.  Leave on 10 minutes then rinse.  You may follow with regular shampoo if you wish.  Also once a week, on a different day, use Selsun Blue in the same manner- applied a wet scalp.  Massage into scalp.  Leave on 10 minutes then rinse.  Head and shoulders occasionally to wash your face.

## 2024-04-13 DIAGNOSIS — F41.9 ANXIETY: ICD-10-CM

## 2024-04-13 PROBLEM — Z87.442 HISTORY OF KIDNEY STONES: Status: ACTIVE | Noted: 2021-02-01

## 2024-04-13 PROBLEM — L40.9 PSORIASIS: Status: ACTIVE | Noted: 2024-04-13

## 2024-04-13 RX ORDER — ESCITALOPRAM OXALATE 10 MG/1
TABLET ORAL
Qty: 90 TABLET | Refills: 1 | Status: SHIPPED | OUTPATIENT
Start: 2024-04-13

## 2024-05-27 DIAGNOSIS — E55.9 VITAMIN D DEFICIENCY: ICD-10-CM

## 2024-05-28 RX ORDER — ERGOCALCIFEROL 1.25 MG/1
50000 CAPSULE ORAL WEEKLY
Qty: 12 CAPSULE | Refills: 0 | OUTPATIENT
Start: 2024-05-28

## 2024-07-23 ENCOUNTER — CONSULT (OUTPATIENT)
Dept: GASTROENTEROLOGY | Facility: AMBULARY SURGERY CENTER | Age: 49
End: 2024-07-23
Payer: COMMERCIAL

## 2024-07-23 VITALS
OXYGEN SATURATION: 98 % | WEIGHT: 236 LBS | HEIGHT: 70 IN | BODY MASS INDEX: 33.79 KG/M2 | DIASTOLIC BLOOD PRESSURE: 84 MMHG | HEART RATE: 86 BPM | SYSTOLIC BLOOD PRESSURE: 138 MMHG

## 2024-07-23 DIAGNOSIS — Z12.11 SCREENING FOR COLON CANCER: Primary | ICD-10-CM

## 2024-07-23 PROCEDURE — 99203 OFFICE O/P NEW LOW 30 MIN: CPT | Performed by: INTERNAL MEDICINE

## 2024-07-23 RX ORDER — SODIUM PICOSULFATE, MAGNESIUM OXIDE, AND ANHYDROUS CITRIC ACID 12; 3.5; 1 G/175ML; G/175ML; MG/175ML
LIQUID ORAL
Qty: 350 ML | Refills: 0 | Status: SHIPPED | OUTPATIENT
Start: 2024-07-23

## 2024-07-23 NOTE — PATIENT INSTRUCTIONS
Scheduled date of colonoscopy (as of today): Oct. 11, 2024  Physician performing colonoscopy: Dr. Hawk  Location of colonoscopy: [facility]  Bowel prep reviewed with patient: Clenpiq  Instructions reviewed with patient by: EMIL  Clearances: N/A

## 2024-07-23 NOTE — PROGRESS NOTES
[facility] -  [facility]  Stanley Sol 1975 male         ASSESSMENT & PLAN:    Screening for colon cancer  Screening for colon cancer - patient is at average risk for colon cancer screening.  Rule out colorectal lesions including polyps or malignancy.    -Schedule for colonoscopy.    -High-fiber diet.    -Patient was given instructions about the colonoscopy prep.    -Patient was explained about the risks and benefits of the procedure. Risks including but not limited to bleeding, infection, perforation were explained in detail. Also explained about less than 100% sensitivity with the exam and other alternatives.      Chief Complaint: For colonoscopy    HPI: 49-year-old male with history of anxiety, depression, BPH was referred for screening colonoscopy.  Never had a colonoscopy in the past.  Patient has regular bowel movements and denies any blood or mucus in the stool.  Appetite is good and denies any recent weight loss.  Reports having discomfort in the lower abdomen when he gets up in the morning that gets better after urination and walking around.  Denies any nausea or vomiting.  Has no heartburn or acid reflux.  Denies any difficulty swallowing.    Chaperon: Ms. Iqbal    REVIEW OF SYSTEMS: Review of Systems   Constitutional:  Negative for activity change, appetite change, chills, diaphoresis, fatigue, fever and unexpected weight change.   HENT:  Negative for ear discharge, ear pain, facial swelling, hearing loss, nosebleeds, sore throat, tinnitus and voice change.    Eyes:  Negative for pain, discharge, redness, itching and visual disturbance.   Respiratory:  Negative for apnea, cough, chest tightness, shortness of breath and wheezing.    Cardiovascular:  Negative for chest pain and palpitations.   Gastrointestinal:         As noted in HPI   Endocrine: Negative for cold intolerance, heat intolerance and polyuria.   Genitourinary:  Positive for frequency. Negative for difficulty  urinating, dysuria, flank pain, hematuria and urgency.   Musculoskeletal:  Negative for arthralgias, back pain, gait problem, joint swelling and myalgias.   Skin:  Negative for rash and wound.   Neurological:  Negative for dizziness, tremors, seizures, speech difficulty, light-headedness, numbness and headaches.   Hematological:  Negative for adenopathy. Does not bruise/bleed easily.   Psychiatric/Behavioral:  Negative for agitation, behavioral problems and confusion. The patient is not nervous/anxious.         Past Medical History:   Diagnosis Date    Depression     Elevated liver enzymes     Heartburn     zoya at nighttime if eats too late    History of alcohol abuse     History of drug abuse (HCC)     Hypertriglyceridemia     Kidney stone     Obesity     Recurrent depression (HCC)     Vitamin D deficiency       Past Surgical History:   Procedure Laterality Date    HAND SURGERY Right     [place] CYSTO/URETERO W/LITHOTRIPSY &INDWELL STENT INSRT Left 2021    Procedure: CYSTOSCOPY URETEROSCOPY WITH LITHOTRIPSY HOLMIUM LASER, AND INSERTION STENT URETERAL;  Surgeon: Adi Munoz MD;  Location: [facility];  Service: Urology     Social History     Socioeconomic History    Marital status: /Civil Union     Spouse name: Not on file    Number of children: 4    Years of education: Not on file    Highest education level: Not on file   Occupational History    Occupation: .    Tobacco Use    Smoking status: Former     Current packs/day: 0.00     Average packs/day: 2.0 packs/day for 30.0 years (60.0 ttl pk-yrs)     Types: Cigarettes     Start date: 2/10/1988     Quit date: 2/10/2016     Years since quittin.4    Smokeless tobacco: Current   Vaping Use    Vaping status: Every Day    Substances: Nicotine, Flavoring   Substance and Sexual Activity    Alcohol use: Not Currently     Comment: former etoh abuse, sober x 9 years    Drug use: Not Currently     Comment: sober x 9 years    Sexual activity: Yes      Partners: Female     Birth control/protection: None   Other Topics Concern    Not on file   Social History Narrative    Not on file     Social Determinants of Health     Financial Resource Strain: Not on file   Food Insecurity: Not on file   Transportation Needs: Not on file   Physical Activity: Not on file   Stress: Not on file   Social Connections: Not on file   Intimate Partner Violence: Not on file   Housing Stability: Not on file     Family History   Problem Relation Age of Onset    Bipolar disorder Mother     Alcohol abuse Father     Drug abuse Father     No Known Problems Son     No Known Problems Daughter     No Known Problems Maternal Grandmother     No Known Problems Maternal Grandfather     No Known Problems Paternal Grandmother     No Known Problems Paternal Grandfather     No Known Problems Daughter     No Known Problems Daughter     Lupus Sister      Pneumovax [pneumococcal polysaccharide vaccine]  Current Outpatient Medications   Medication Sig Dispense Refill    Cholecalciferol (Vitamin D) 50 MCG (2000 UT) CAPS Take 1 capsule (2,000 Units total) by mouth daily Over the counter      clobetasol (TEMOVATE) 0.05 % ointment Apply topically 2 (two) times a day 2 weeks on, 2 weeks off to knee 60 g 2    ergocalciferol (VITAMIN D2) 50,000 units Take 1 capsule (50,000 Units total) by mouth once a week 12 capsule 1    escitalopram (LEXAPRO) 10 mg tablet TAKE 1 TABLET BY MOUTH EVERY DAY 90 tablet 1    gabapentin (NEURONTIN) 300 mg capsule Take 1-2 capsules (300-600 mg total) by mouth daily at bedtime Increase as directed 180 capsule 1    hydrocortisone 1 % cream Apply topically 2 (two) times a day as needed for irritation or rash No longer than 2 weeks.for itching on face and ears 15 g 0    ketoconazole (NIZORAL) 2 % cream Apply topically daily To face rash 45 g 2    Multiple Vitamin (MULTIVITAMIN ADULT PO) Take 1 tablet by mouth in the morning      Omega-3 Fatty Acids (FISH OIL PO) Take 1 capsule by mouth in  "the morning      sodium picosulfate, magnesium oxide, citric acid (Clenpiq) oral solution Take 175 mL (1 bottle) the evening before the colonoscopy, between 5 PM and 9 PM, followed by a second 175 mL bottle 5 hours before the colonoscopy. 350 mL 0    tamsulosin (FLOMAX) 0.4 mg Take 2 capsules (0.8 mg total) by mouth daily with dinner 180 capsule 3     No current facility-administered medications for this visit.       Blood pressure 138/84, pulse 86, height 5' 10\" (1.778 m), weight 107 kg (236 lb), SpO2 98%.    PHYSICAL EXAM: Physical Exam  Constitutional:       Appearance: He is well-developed.   HENT:      Head: Normocephalic and atraumatic.   Eyes:      General: No scleral icterus.        Right eye: No discharge.         Left eye: No discharge.      Conjunctiva/sclera: Conjunctivae normal.      Pupils: Pupils are equal, round, and reactive to light.   Neck:      Thyroid: No thyromegaly.      Vascular: No JVD.      Trachea: No tracheal deviation.   Cardiovascular:      Rate and Rhythm: Normal rate and regular rhythm.      Heart sounds: Normal heart sounds. No murmur heard.     No friction rub. No gallop.   Pulmonary:      Effort: Pulmonary effort is normal. No respiratory distress.      Breath sounds: Normal breath sounds. No wheezing or rales.   Chest:      Chest wall: No tenderness.   Abdominal:      General: Bowel sounds are normal. There is no distension.      Palpations: Abdomen is soft. There is no mass.      Tenderness: There is no abdominal tenderness. There is no guarding or rebound.      Hernia: No hernia is present.   Musculoskeletal:      Cervical back: Neck supple.   Lymphadenopathy:      Cervical: No cervical adenopathy.   Skin:     General: Skin is warm and dry.      Findings: No erythema or rash.   Neurological:      Mental Status: He is alert and oriented to person, place, and time.   Psychiatric:         Behavior: Behavior normal.         Thought Content: Thought content normal.          Lab " "Results   Component Value Date    WBC 8.11 01/23/2021    HGB 14.3 01/23/2021    HCT 41.0 01/23/2021    MCV 93 01/23/2021     01/23/2021     Lab Results   Component Value Date    CALCIUM 10.0 11/11/2023    K 4.9 11/11/2023    CO2 28 11/11/2023     11/11/2023    BUN 20 11/11/2023    CREATININE 0.87 11/11/2023     Lab Results   Component Value Date    ALT 30 11/11/2023    AST 22 11/11/2023    ALKPHOS 76 11/11/2023     No results found for: \"INR\", \"PROTIME\"    No results found.        "

## 2024-08-19 ENCOUNTER — TELEPHONE (OUTPATIENT)
Dept: GASTROENTEROLOGY | Facility: AMBULARY SURGERY CENTER | Age: 49
End: 2024-08-19

## 2024-08-22 PROBLEM — Z12.11 SCREENING FOR COLON CANCER: Status: RESOLVED | Noted: 2024-07-23 | Resolved: 2024-08-22

## 2024-10-05 ENCOUNTER — HOSPITAL ENCOUNTER (OUTPATIENT)
Dept: GASTROENTEROLOGY | Facility: HOSPITAL | Age: 49
Setting detail: OUTPATIENT SURGERY
Discharge: HOME | End: 2024-10-05
Attending: INTERNAL MEDICINE
Payer: COMMERCIAL

## 2024-10-05 ENCOUNTER — ANESTHESIA EVENT (OUTPATIENT)
Dept: GASTROENTEROLOGY | Facility: HOSPITAL | Age: 49
End: 2024-10-05
Payer: COMMERCIAL

## 2024-10-05 ENCOUNTER — ANESTHESIA (OUTPATIENT)
Dept: GASTROENTEROLOGY | Facility: HOSPITAL | Age: 49
End: 2024-10-05
Payer: COMMERCIAL

## 2024-10-05 VITALS
TEMPERATURE: 97.4 F | RESPIRATION RATE: 18 BRPM | WEIGHT: 235 LBS | OXYGEN SATURATION: 96 % | DIASTOLIC BLOOD PRESSURE: 87 MMHG | SYSTOLIC BLOOD PRESSURE: 135 MMHG | HEIGHT: 69 IN | BODY MASS INDEX: 34.8 KG/M2 | HEART RATE: 70 BPM

## 2024-10-05 DIAGNOSIS — Z12.11 SCREENING FOR COLON CANCER: ICD-10-CM

## 2024-10-05 PROCEDURE — 88305 TISSUE EXAM BY PATHOLOGIST: CPT | Performed by: PATHOLOGY

## 2024-10-05 PROCEDURE — 45385 COLONOSCOPY W/LESION REMOVAL: CPT | Performed by: INTERNAL MEDICINE

## 2024-10-05 RX ORDER — SODIUM CHLORIDE, SODIUM LACTATE, POTASSIUM CHLORIDE, CALCIUM CHLORIDE 600; 310; 30; 20 MG/100ML; MG/100ML; MG/100ML; MG/100ML
50 INJECTION, SOLUTION INTRAVENOUS CONTINUOUS
Status: DISCONTINUED | OUTPATIENT
Start: 2024-10-05 | End: 2024-10-09 | Stop reason: HOSPADM

## 2024-10-05 RX ORDER — SODIUM CHLORIDE, SODIUM LACTATE, POTASSIUM CHLORIDE, CALCIUM CHLORIDE 600; 310; 30; 20 MG/100ML; MG/100ML; MG/100ML; MG/100ML
INJECTION, SOLUTION INTRAVENOUS CONTINUOUS PRN
Status: DISCONTINUED | OUTPATIENT
Start: 2024-10-05 | End: 2024-10-05

## 2024-10-05 RX ORDER — PROPOFOL 10 MG/ML
INJECTION, EMULSION INTRAVENOUS AS NEEDED
Status: DISCONTINUED | OUTPATIENT
Start: 2024-10-05 | End: 2024-10-05

## 2024-10-05 RX ADMIN — PROPOFOL 50 MG: 10 INJECTION, EMULSION INTRAVENOUS at 09:38

## 2024-10-05 RX ADMIN — PROPOFOL 30 MG: 10 INJECTION, EMULSION INTRAVENOUS at 09:40

## 2024-10-05 RX ADMIN — SODIUM CHLORIDE, SODIUM LACTATE, POTASSIUM CHLORIDE, AND CALCIUM CHLORIDE: .6; .31; .03; .02 INJECTION, SOLUTION INTRAVENOUS at 09:24

## 2024-10-05 RX ADMIN — PROPOFOL 50 MG: 10 INJECTION, EMULSION INTRAVENOUS at 09:34

## 2024-10-05 RX ADMIN — PROPOFOL 100 MG: 10 INJECTION, EMULSION INTRAVENOUS at 09:33

## 2024-10-05 NOTE — DISCHARGE INSTRUCTIONS
Colonoscopy   WHAT YOU NEED TO KNOW:   A colonoscopy is a procedure to examine the inside of your colon (intestine) with a scope. Polyps or tissue growths may have been removed during your colonoscopy. It is normal to feel bloated and to have some abdominal discomfort. You should be passing gas. If you have hemorrhoids or you had polyps removed, you may have a small amount of bleeding.        DISCHARGE INSTRUCTIONS:   Seek care immediately if:   You have sudden, severe abdominal pain.     You have problems swallowing.     You have a large amount of black, sticky bowel movements or blood in your bowel movements.     You have sudden trouble breathing.     You feel weak, lightheaded, or faint or your heart beats faster than normal for you.     Contact your healthcare provider if:   You have a fever and chills.      You have nausea or are vomiting.      Your abdomen is bloated or feels full and hard.     You have abdominal pain.   You have black, sticky bowel movements or blood in your bowel movements.  You have not had a bowel movement for 3 days after your procedure.  You have rash or hives.  You have questions or concerns about your procedure.    Activity:   Do not lift, strain, or run for 24 hours after your procedure.     Rest after your procedure. You have been given medicine to relax you. Do not drive or make important decisions until the day after your procedure. Return to your normal activity as directed.     Relieve gas and discomfort from bloating by lying on your right side with a heating pad on your abdomen. You may need to take short walks to help the gas move out. Eat small meals until bloating is relieved.  Follow up with your healthcare provider as directed: Write down your questions so you remember to ask them during your visits.     If you take a “blood thinner”, please review the specific instructions from your endoscopist about when you should resume it. These can be found in the “Recommendation”  and “Your Medication list” sections of this After Visit Summary.  \

## 2024-10-05 NOTE — ANESTHESIA PREPROCEDURE EVALUATION
Procedure:  COLONOSCOPY    Relevant Problems   CARDIO   (+) Hypertriglyceridemia      MUSCULOSKELETAL   (+) Cervical spondylosis without myelopathy      NEURO/PSYCH   (+) Anxiety   (+) Chronic right shoulder pain   (+) Major depressive disorder, recurrent episode, moderate (HCC)      PULMONARY   (+) KELLY (obstructive sleep apnea)        Physical Exam    Airway    Mallampati score: III         Dental       Cardiovascular  Rhythm: regular    Pulmonary   Breath sounds clear to auscultation    Other Findings        Anesthesia Plan  ASA Score- 2     Anesthesia Type- IV sedation with anesthesia with ASA Monitors.         Additional Monitors:     Airway Plan:            Plan Factors-    Chart reviewed.                      Induction- intravenous.    Postoperative Plan-         Informed Consent- Anesthetic plan and risks discussed with patient.

## 2024-10-05 NOTE — H&P
History and Physical - [facility]  Stanley Sol 49 y.o. male MRN: [id number]        HPI: 49-year-old male was referred for screening colonoscopy.  Regular bowel movements.    Historical Information   Past Medical History:   Diagnosis Date    Depression     Elevated liver enzymes     Heartburn     zoya at nighttime if eats too late    History of alcohol abuse     History of drug abuse (HCC)     Hypertriglyceridemia     Kidney stone     Obesity     Recurrent depression (HCC)     Vitamin D deficiency      Past Surgical History:   Procedure Laterality Date    HAND SURGERY Right     [place] CYSTO/URETERO W/LITHOTRIPSY &INDWELL STENT INSRT Left 2021    Procedure: CYSTOSCOPY URETEROSCOPY WITH LITHOTRIPSY HOLMIUM LASER, AND INSERTION STENT URETERAL;  Surgeon: Adi Munoz MD;  Location: [facility];  Service: Urology     Social History   Social History     Substance and Sexual Activity   Alcohol Use Not Currently    Comment: former etoh abuse, sober x 9 years     Social History     Substance and Sexual Activity   Drug Use Not Currently    Comment: sober x 9 years     Social History     Tobacco Use   Smoking Status Former    Current packs/day: 0.00    Average packs/day: 2.0 packs/day for 30.0 years (60.0 ttl pk-yrs)    Types: Cigarettes    Start date: 2/10/1988    Quit date: 2/10/2016    Years since quittin.6   Smokeless Tobacco Current     Family History   Problem Relation Age of Onset    Bipolar disorder Mother     Alcohol abuse Father     Drug abuse Father     No Known Problems Son     No Known Problems Daughter     No Known Problems Maternal Grandmother     No Known Problems Maternal Grandfather     No Known Problems Paternal Grandmother     No Known Problems Paternal Grandfather     No Known Problems Daughter     No Known Problems Daughter     Lupus Sister        Meds/Allergies     Not in a hospital admission.    Allergies   Allergen Reactions    Pneumovax [Pneumococcal Polysaccharide Vaccine]  "Fever     High fever - went to ER        Objective     Blood pressure 137/92, pulse 71, temperature (!) 97.4 °F (36.3 °C), temperature source Temporal, resp. rate 19, height 5' 9\" (1.753 m), weight 107 kg (235 lb), SpO2 98%.    Physical Exam:    Chest- CTA  Heart- RRR  Abdomen- NT/ND  Extremities- No edema    ASSESSMENT:     Screening for colon cancer    PLAN:    Colonoscopy              "

## 2024-10-06 PROBLEM — Z86.0100 HISTORY OF COLON POLYPS: Status: ACTIVE | Noted: 2024-10-06

## 2024-10-09 PROCEDURE — 88305 TISSUE EXAM BY PATHOLOGIST: CPT | Performed by: PATHOLOGY

## 2024-10-12 ENCOUNTER — RA CDI HCC (OUTPATIENT)
Dept: OTHER | Facility: HOSPITAL | Age: 49
End: 2024-10-12

## 2024-10-12 NOTE — PROGRESS NOTES
HCC coding opportunities       Chart reviewed, no opportunity found: CHART REVIEWED, NO OPPORTUNITY FOUND        Patients Insurance        Commercial Insurance: [id number]

## 2024-10-18 ENCOUNTER — OFFICE VISIT (OUTPATIENT)
Dept: FAMILY MEDICINE CLINIC | Facility: CLINIC | Age: 49
End: 2024-10-18
Payer: COMMERCIAL

## 2024-10-18 VITALS
SYSTOLIC BLOOD PRESSURE: 134 MMHG | TEMPERATURE: 98.2 F | HEART RATE: 84 BPM | WEIGHT: 250.6 LBS | OXYGEN SATURATION: 98 % | RESPIRATION RATE: 18 BRPM | BODY MASS INDEX: 37.12 KG/M2 | DIASTOLIC BLOOD PRESSURE: 78 MMHG | HEIGHT: 69 IN

## 2024-10-18 DIAGNOSIS — E66.01 CLASS 2 SEVERE OBESITY DUE TO EXCESS CALORIES WITH SERIOUS COMORBIDITY AND BODY MASS INDEX (BMI) OF 37.0 TO 37.9 IN ADULT (HCC): ICD-10-CM

## 2024-10-18 DIAGNOSIS — E55.9 VITAMIN D DEFICIENCY: ICD-10-CM

## 2024-10-18 DIAGNOSIS — H61.91 LESION OF SKIN OF RIGHT EAR: ICD-10-CM

## 2024-10-18 DIAGNOSIS — E66.812 CLASS 2 SEVERE OBESITY DUE TO EXCESS CALORIES WITH SERIOUS COMORBIDITY AND BODY MASS INDEX (BMI) OF 37.0 TO 37.9 IN ADULT (HCC): ICD-10-CM

## 2024-10-18 DIAGNOSIS — N40.1 BENIGN PROSTATIC HYPERPLASIA WITH URINARY FREQUENCY: ICD-10-CM

## 2024-10-18 DIAGNOSIS — R35.0 BENIGN PROSTATIC HYPERPLASIA WITH URINARY FREQUENCY: ICD-10-CM

## 2024-10-18 DIAGNOSIS — Z00.00 WELL ADULT EXAM: Primary | ICD-10-CM

## 2024-10-18 DIAGNOSIS — Z12.2 SCREENING FOR LUNG CANCER: ICD-10-CM

## 2024-10-18 DIAGNOSIS — F33.1 MAJOR DEPRESSIVE DISORDER, RECURRENT EPISODE, MODERATE (HCC): ICD-10-CM

## 2024-10-18 DIAGNOSIS — R53.83 OTHER FATIGUE: ICD-10-CM

## 2024-10-18 DIAGNOSIS — Z23 ENCOUNTER FOR IMMUNIZATION: ICD-10-CM

## 2024-10-18 DIAGNOSIS — F41.9 ANXIETY: ICD-10-CM

## 2024-10-18 PROCEDURE — 99396 PREV VISIT EST AGE 40-64: CPT | Performed by: FAMILY MEDICINE

## 2024-10-18 PROCEDURE — 90471 IMMUNIZATION ADMIN: CPT

## 2024-10-18 PROCEDURE — 99214 OFFICE O/P EST MOD 30 MIN: CPT | Performed by: FAMILY MEDICINE

## 2024-10-18 PROCEDURE — 90715 TDAP VACCINE 7 YRS/> IM: CPT

## 2024-10-18 RX ORDER — ESCITALOPRAM OXALATE 10 MG/1
10 TABLET ORAL DAILY
Qty: 90 TABLET | Refills: 2 | Status: SHIPPED | OUTPATIENT
Start: 2024-10-18

## 2024-10-18 RX ORDER — MULTIVIT-MIN/IRON/FOLIC ACID/K 18-600-40
4000 CAPSULE ORAL DAILY
Status: SHIPPED
Start: 2024-10-18

## 2024-10-18 NOTE — PROGRESS NOTES
Assessment/Plan:     Assessment & Plan  Anxiety  Stable   Orders:    escitalopram (LEXAPRO) 10 mg tablet; Take 1 tablet (10 mg total) by mouth daily    Screening for lung cancer  Agrees to first lung cancer screening   I discussed with him that he is a candidate for lung cancer CT screening.     The following Shared Decision-Making points were covered:  Benefits of screening were discussed, including the rates of reduction in death from lung cancer and other causes.  Harms of screening were reviewed, including false positive tests, radiation exposure levels, risks of invasive procedures, risks of complications of screening, and risk of overdiagnosis.  I counseled on the importance of adherence to annual lung cancer LDCT screening, impact of co-morbidities, and ability or willingness to undergo diagnosis and treatment.  I counseled on the importance of maintaining abstinence as a former smoker or was counseled on the importance of smoking cessation if a current smoker    Review of Eligibility Criteria: He meets all of the criteria for Lung Cancer Screening.   He is 49 y.o.   He has 20 pack year tobacco history and is a current smoker or has quit within the past 15 years  He presents no signs or symptoms of lung cancer    After discussion, the patient decided to elect lung cancer screening.    Orders:    CT lung screening program    Encounter for immunization  [facility]  Orders:    TDAP VACCINE GREATER THAN OR EQUAL TO 8YO IM    Other fatigue  Add TSH to labs   Orders:    TSH, 3rd generation; Future    Vitamin D deficiency  Completed prescription vitamin D, now on over the counter vitamin D 4000 iu daily   Orders:    Cholecalciferol (Vitamin D) 50 MCG (2000 UT) CAPS; Take 2 capsules (4,000 Units total) by mouth daily Over the counter    Lesion of skin of right ear    Orders:    Ambulatory Referral to Plastic Surgery; Future    Well adult exam         Major depressive disorder, recurrent episode, moderate  (HCC)           Benign prostatic hyperplasia with urinary frequency  Failed flomax (retrograde ejaculation)   Consider daily Cialis          Class 2 severe obesity due to excess calories with serious comorbidity and body mass index (BMI) of 37.0 to 37.9 in adult (HCC)      Recommend Zepbound (could also consider Wegovy and Saxenda)   Pt has not had success with weight loss with healthy diet and exercise alone, but will continue to work on these.     Patient is to call her insurance to see if this is covered, and then call pharmacies to find the medication.  When he does this we can submit the medication for prior authorization.  Follow up 3 months after starting GLP.    Patient has not been on any weight loss medicines in the past 12 months  This is a new start - he is not on another GLP medication currently or in the past 12 months   No personal or family history of thyroid cancer  No personal history of pancreatitis  A GLP medication is recommended for improvement in weight and comorbidities associated with obesity  - see problem list.  Risks and benefits and side effects of medication reviewed  Body mass index is 37.01 kg/m².  Wt Readings from Last 1 Encounters:   10/18/24 114 kg (250 lb 9.6 oz)                Well adult exam  ·         Continue healthy diet   ·         Encourage exercise 4 times a week or more for minimum 30 minutes.   ·         Continue to see dentist, wear seatbelt.  ·         Health maintenance reviewed - declines flu, Tdap given. Update fasting blood work.   Reviewed age appropriate health maintenance screenings and immunizations that are due, risks and benefits of these.   Health Maintenance   Topic Date Due    Hepatitis A Vaccine (1 of 2 - Risk 2-dose series) Never done    Lung Cancer Screening  Never done    Zoster Vaccine (1 of 2) Never done    Influenza Vaccine (1) 09/01/2024    COVID-19 Vaccine (4 - 2023-24 season) 09/01/2024    Annual Physical  10/10/2024    Depression Screening   04/12/2025    DTaP,Tdap,and Td Vaccines (3 - Td or Tdap) 01/14/2025    Depression Follow-up Plan  04/12/2025    Colorectal Cancer Screening  10/04/2029    RSV Vaccine Age 60+ Years (1 - 1-dose 60+ series) 05/29/2034    HIV Screening  Completed    Hepatitis C Screening  Completed    RSV Vaccine age 0-20 Months  Aged Out    Pneumococcal Vaccine: Pediatrics (0 to 5 Years) and At-Risk Patients (6 to 64 Years)  Aged Out    HIB Vaccine  Aged Out    IPV Vaccine  Aged Out    Meningococcal ACWY Vaccine  Aged Out    HPV Vaccine  Aged Out     Return in about 6 months (around 4/18/2025) for CC (after 3 mohnths if he starts GLP) .    Subjective:    MIRTA Angel is a 49 y.o. male who presents today for a physical.     Chief Complaint   Patient presents with    Physical Exam     Labs not completed.     Flu - declines, declines shingles, denies PCV20     Medication Management     Pt would like to discuss what dose he should be taking of Vit D, currently on 2000 IU         Patient Care Team:  Felisa Munoz DO as PCP - General (Family Medicine)    PHQ-2/9 Depression Screening                ---Above per clinical staff & reviewed. 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         The following portions of the patient's history were reviewed and updated as appropriate: allergies, current medications, past family history, past medical history, past social history, past  "surgical history and problem list.     Current Medications:  Current Outpatient Medications   Medication Sig Dispense Refill    Cholecalciferol (Vitamin D) 50 MCG (2000 UT) CAPS Take 2 capsules (4,000 Units total) by mouth daily Over the counter      escitalopram (LEXAPRO) 10 mg tablet Take 1 tablet (10 mg total) by mouth daily 90 tablet 2    gabapentin (NEURONTIN) 300 mg capsule Take 1-2 capsules (300-600 mg total) by mouth daily at bedtime Increase as directed (Patient taking differently: Take 300-600 mg by mouth as needed Increase as directed) 180 capsule 1    Multiple Vitamin (MULTIVITAMIN ADULT PO) Take 1 tablet by mouth in the morning      Omega-3 Fatty Acids (FISH OIL PO) Take 1 capsule by mouth in the morning      tamsulosin (FLOMAX) 0.4 mg Take 2 capsules (0.8 mg total) by mouth daily with dinner (Patient not taking: Reported on 10/18/2024) 180 capsule 3     No current facility-administered medications for this visit.        Objective:      /78 (BP Location: Left arm, Patient Position: Sitting, Cuff Size: Standard)   Pulse 84   Temp 98.2 °F (36.8 °C) (Temporal)   Resp 18   Ht 5' 9\" (1.753 m)   Wt 114 kg (250 lb 9.6 oz)   SpO2 98%   BMI 37.01 kg/m²   BP Readings from Last 3 Encounters:   10/18/24 134/78   10/05/24 135/87   07/23/24 138/84     Wt Readings from Last 3 Encounters:   10/18/24 114 kg (250 lb 9.6 oz)   10/05/24 107 kg (235 lb)   07/23/24 107 kg (236 lb)       Review of Systems  ROS:  all others negative - no chest pain, SOB, normal urine and bowels. no GERD. sleeping well. mood as above.     Physical Exam   Constitutional: he appears well-developed and well-nourished.   HENT: Head: Normocephalic.   Right Ear: External ear normal. Tympanic membrane normal.   Left Ear: External ear normal. Tympanic membrane normal.   Nose: Nose normal. No mucosal edema, No rhinorrhea.   Right sinus exhibits no maxillary sinus tenderness.   Left sinus exhibits no maxillary sinus tenderness. "   Mouth/Throat: Oropharynx is clear and moist.   Eyes: Normal conjunctiva.  No erythema. No discharge.  Neck: No pain on exam. Neck supple.   Cardiovascular: Normal rate, regular rhythm and normal heart sounds.    Pulmonary/Chest: Effort normal and breath sounds normal. No wheezes. No rales. No rhonchi.   Abdominal: Soft. Bowel sounds are normal. There is no tenderness.   Musculoskeletal: he exhibits no edema.   Lymphadenopathy: he has no cervical adenopathy.   Neurological: he  is alert and oriented to person, place, and time.   Skin: Skin is warm and dry. No rashes.  Psychiatric: he  has a normal mood and affect. his behavior is normal. Thought content normal.   Vitals reviewed.

## 2024-10-18 NOTE — ASSESSMENT & PLAN NOTE
Stable   Orders:    escitalopram (LEXAPRO) 10 mg tablet; Take 1 tablet (10 mg total) by mouth daily

## 2024-10-18 NOTE — PATIENT INSTRUCTIONS
Weight Loss Medications - please read carefully   Saxenda, Wegovy, [x] Zepbound are FDA approved for weight loss with a prior authorization. Call to see if these are approved by your insurance.     We have the additional options of Ozempic or Mounjaro if you have type 2 diabetes only.       [x] How to start the process: please read carefully   Call to see if one of the above medications are covered by your insurance.  Next call around to see if any pharmacies have the medication in stock. We cannot fill it until you know they have it in stock.   Next send us a [employer] message to let us know which one so we can send it in. The medication will need to be increased either weekly (for Saxenda) or monthly (for the others), so we cannot send it to a mail away pharmacy until you get to the maintenance dose.   Once  the prescription gets sent in we will need to get a prior auth from the insurance company. Allow 1 - 2 weeks for this process. If you have not hear anything regarding approval or denial from our office by this time, please reach out to us.  Once you start the medication you will need to contact our office every 2 - 3 weeks in order to get the next dose. Let us know your current dose and what pharmacy you want it sent to. You can call or send a [employer] message. Do not send a Medication Refill message, or have the pharmacy contact us,  or the last dose will be refilled automatically. Be sure to give enough time incase your insurance requires prior auth for every new dose (this can take up to 21 days).   We will need to see you about every 3 months to check on how this is working and to communicate with your insurance company that you are doing well. Please keep these scheduled appointments.       [x] Zepbound   Month 1:  2.5 mg subQ once weekly for 4 weeks  Month 2:  5 mg weekly x 4 weeks    Month 3:  7.5 mg weekly x 4 weeks   Month 4:  10 mg weekly x 4 weeks   Month 5: 12.5 mg weekly x 4 weeks   Month 6:  15 mg  weekly (max dose/maintenance dose)       How to give it:  Inject subQ into the thigh, abdomen, or upper arm; rotate injection sites 2,1.  Administer at any time of day, with or without meals 2,1.  Administer separately from insulin (do not mix the products); may inject both medications in the same body region but not adjacent to each other 2.  The day of the weekly administration can be changed as long as the time between the 2 doses is at least 3 days (72 hours) 2,1.  Administer a missed dose as soon as possible within 4 days (96 hours) of missed dose; if more than 4 days have passed, skip the missed dose and administer next dose on regularly scheduled day and resume regular once weekly dosing schedule    Call if:  You feel fullness or pain in the thyroid area (front and middle of your neck)    Bad upper abdominal pain that is not going away.   Nausea or vomiting that persists.  Any changes in vision 2.  You have worsening depression, suicidal ideation, or unusual changes in behavior 1.    Other precautions:  It is advised if you use an oral hormonal contraceptive to switch to a non-oral contraceptive method, or add a barrier method of contraception for 4 weeks after initiation and for 4 weeks after each dose escalation 2.  Side effects may include nausea, diarrhea, decreased appetite, vomiting, constipation, dyspepsia, and abdominal pain 2.  Take precautions to avoid dehydration 2.  If you have diabetes monitor for symptoms of hypoglycemia and report difficulties with glycemic control 1.  If you miss a dose administer a missed dose as soon as possible within 4 days. If more than 4 days have passed, skip the missed dose, administer the next dose on the regularly scheduled day, and resume the regular once-weekly dosing schedule 2.      Saxenda  This is how to start Saxenda. It is a daily injection to help with weight loss.     WEEK 1: 0.6mg daily  -if tolerated,  WEEK 2: 1.2mg daily  -if tolerated,  WEEK 3: 1.8mg  daily  -if tolerated,  WEEK 4: 2.4mg daily  -if tolerated,  WEEK 5: 3mg daily and then continue at this dose     If you miss more than 3 days of the medication you should restart this from the beginning.     VISIT [email or web address] to see about coupons, how to give yourself injections, and more information     If you develop nausea or vomiting, STOP the medication for a few days, then DECREASE to the previous dose that you tolerated well.   Stay well hydrated.  You can inject in your stomach, upper leg or upper arm   Unused pens should be stored in the refrigerator, but the pen in use can he kept at room temperature for up to 30 days   If you develop severe abdominal pain, pain radiating from your abdomen to the back, or fever associated with abdominal pain/vomiting, please call or go to the ER as this can be a sign of pancreatitis which can be an adverse effect of the medication.    Action: Cause slower gastric emptying which will increase feeling full with meals.  This feeling fullness will allow for you to reduce calorie intake sooner than usual and begin to lose some weight.  These medications also will reduce production of glucose form your liver to allow for better control of your sugars.   Reminders: These medications do not cause low blood sugars.   Keep in the refrigerator until you use it once, then keep it out of refrigerator.  You ONLY need to prime the pen upon open it monthly.  Clean your hands and site of injection to avoid infection risks. Store used needles in old plastic laundry detergent bin or coffee bin, then tape it when full and discard in garbage.   Side Effects:  Common side effects include full sensation with eating, nausea which usually improves over the first 1-2 weeks. Soreness, redness or lump at site of injection.  Dangers:  Pancreatitis can happen with this medicine for some people; therefore if you have ever had pancreatitis or have severe nausea vomiting and abdominal pain while on this  medicine stop it immediately and call us or go to ER for assistance. Do not use if you have had a history of thyroid cancer or a family history of MEN syndrome. If you are diabetic and use this medication with insulin or sulfonylureas there is a risk of low blood sugar. Monitor your sugars more closely.  If you are not able to urinate properly this could be a sign of a kidney problem. Let us know right away. In the clinical trials there were issues with gallbladder problems like gallstones. Let us know if you develop severe abdominal pain. If you have new depression or thoughts of suicide you should also contact us right away, and call 911 or go to your nearest emergency room.         Wegovy   Wegovy dosing:  Week 1 - 4:  0.25 mg weekly   Week 5 - 8:  0.5 mg weekly   Week 9 - 12:  1 mg weekly   Week 13 - 16:  1.7 mg weekly   Maintenance from there:  2.4 mg weekly.     Wegovy is a WEEKLY non-insulin injectable. GLP-1Agonist  Semaglutide (Wegovy) FDA approved in 2014 as adjunct to reduced calorie diet and increased physical activity for chronic weight management in adults with initial BMI of >= 30 kg/m2 or >= 27 kg/m2 with >= 1 weight-related comorbid condition. There were several studies run over 68 week periods of time.  During this time they diabetic patients lose on average 6.2% of their body weight. In a different trial involving non-diabetics the weight loss was closer to 12 - 15%.          Action: Cause slower gastric emptying which will increase feeling full with meals.  This feeling fullness will allow for you to reduce calorie intake sooner than usual and begin to lose some weight.  These medications also will reduce production of glucose form your liver to allow for better control of your sugars.       Reminders: These medications do not cause low blood sugars.   Keep in the refrigerator until you use it once, then keep it out of refrigerator.  Clean your hands and site of injection to avoid infection  risks. Store used needles in old plastic laundry detergent bin or coffee bin, then tape it when full and discard in garbage.      Side Effects:  Common side effects include full sensation with eating, nausea which usually improves over the first 1-2 weeks. Soreness, redness or lump at site of injection. Constipation, stomach pain, headache, fatigue, indigestion, dizziness, bloating, burping.      Dangers:  Pancreatitis can happen with this medicine for some people; therefore if you have ever had pancreatitis or have severe nausea vomiting and abdominal pain while on this medicine stop it immediately and call us or go to ER for assistance. Similarly gallbladder disease.  Do not use if you have had a history of thyroid canceror a family history of MEN syndrome.  It can cause low blood sugar if mixed with certain other diabetes medications.  If you have type 2 diabetes you should already be seeing an eye doctor - let them know you are on this medication and contact them if there are changes in your vision.  See the package insert for all serious possible side effects        Covered only if you have type 2 diabetes:    Ozempic  How to start Ozempic  Month 1:  0.25 mg weekly x 4 weeks   Month 2:  0.5 mg weekly x 4 weeks   Month 3:  1 mg weekly x 4 weeks   Month 4:  2 mg weekly thereafter     Go to the Ozempic or [employer] website to look coupons to save on this medication.   About the Ozempic® Pen  Ozempic® (semaglutide) injection 0.5 mg or 1 mg   Ozempic PI ([email or web address])     Ozempic is a  WEEKLY non-insulin injectable. GLP-1Agonist  Victoza (liraglutide) daily, Byetta (exenatide) twice daily, Adlyxin (lixisenatide)  Trulicity (dulaglutide) Bydureon(Exenatide) , Ozempic (semaglutide), Rybelsus (semaglutide),          Action: Cause slower gastric emptying which will increase feeling full with meals.  This feeling fullness will allow for you to reduce calorie intake sooner than usual and begin to lose some weight.  These  medications also will reduce production of glucose form your liver to allow for better control of your sugars.       Reminders: These medications do not cause low blood sugars.   Keep in the refrigerator until you use it once, then keep it out of refrigerator.  Clean your hands and site of injection to avoid infection risks. Store used needles in old plastic laundry detergent bin or coffee bin, then tape it when full and discard in garbage.      Side Effects:  Common side effects include full sensation with eating, nausea which usually improves over the first 1-2 weeks. Soreness, redness or lump at site of injection.     Dangers:  Pancreatitis can happen with this medicine for some people; therefore if you have ever had pancreatitis or have severe nausea vomiting and abdominal pain while on this medicine stop it immediately and call us or go to ER for assistance. Do not use if you have had a history of thyroid canceror a family history of MEN syndrome.     Mounjarno   Mounjaro (tirzepatide) dosing instructions  Month 1 -  2.5 mg weekly x 4 weeks   Month 2 - 5 mg weekly x 4 weeks  Month 3 - 7.5 mg weekly x 4 week   Month 4 - 10 mg weekly x 4 weeks  Month 5 - 12.5 mg weekly x 4 weeks   Week 6 and thereafter - 15 mg weekly     You can inject this in your stomach, thigh, or upper arm.   For savings card program go to [employer] or  3-707-FpivySb (1-248.228.5301)  Common side effects  nausea, diarrhea, decreased appetite, vomiting, constipation, indigestion, and stomach pain.   To help these try eating smaller meals, stop eating when you feel full, avoid eating fat or fatty foods, try eating bland foods like toast, crackers or rice.   If you take birth control pills by mouth these may not work as well while you are on this medication and you may want to increase your dose or use another form of birth control.     Contraindicated with medullary thyroid carcinoma MTC or MEN2.   It may cause tumors of the thyroid,  including thyroid cancer.   Call right away and stop medication if severe pain in stomach with or without vomiting (pancreatitis)   Watch for low blood sugar if taking this medication with a sulfanuria or insulin.   Drink plenty of fluids to avoid dehydration.   If you having gallbladder issues such as pain in your upper abdomen, yellowing of your skin, fever, yuko colored stools, stop this medicine can all your doctor.   Call if you have sudden change in your vision.

## 2024-10-18 NOTE — ASSESSMENT & PLAN NOTE
Completed prescription vitamin D, now on over the counter vitamin D 4000 iu daily   Orders:    Cholecalciferol (Vitamin D) 50 MCG (2000 UT) CAPS; Take 2 capsules (4,000 Units total) by mouth daily Over the counter

## 2024-10-22 ENCOUNTER — PATIENT MESSAGE (OUTPATIENT)
Dept: FAMILY MEDICINE CLINIC | Facility: CLINIC | Age: 49
End: 2024-10-22

## 2024-10-22 DIAGNOSIS — E66.811 CLASS 1 OBESITY DUE TO EXCESS CALORIES WITH SERIOUS COMORBIDITY AND BODY MASS INDEX (BMI) OF 34.0 TO 34.9 IN ADULT: Primary | ICD-10-CM

## 2024-10-22 DIAGNOSIS — E66.09 CLASS 1 OBESITY DUE TO EXCESS CALORIES WITH SERIOUS COMORBIDITY AND BODY MASS INDEX (BMI) OF 34.0 TO 34.9 IN ADULT: Primary | ICD-10-CM

## 2024-10-22 RX ORDER — TIRZEPATIDE 2.5 MG/.5ML
2.5 INJECTION, SOLUTION SUBCUTANEOUS WEEKLY
Qty: 2 ML | Refills: 0 | Status: SHIPPED | OUTPATIENT
Start: 2024-10-22 | End: 2024-11-19

## 2024-10-23 ENCOUNTER — TELEPHONE (OUTPATIENT)
Age: 49
End: 2024-10-23

## 2024-10-23 NOTE — TELEPHONE ENCOUNTER
PA for Tirzepatide (Zepbound) 2.5 mg/0.5 mL auto-injector SUBMITTED     via    []CMM-KEY:   [x]Surescripts-Case ID # [id number]  []Availity-Auth ID # NDC #   []Faxed to plan   []Other website   []Phone call Case ID #     Office notes sent, clinical questions answered. Awaiting determination    Turnaround time for your insurance to make a decision on your Prior Authorization can take 7-21 business days.

## 2024-10-23 NOTE — TELEPHONE ENCOUNTER
PA for Tirzepatide (Zepbound) 2.5 mg/0.5 mL auto-injector  APPROVED     Date(s) approved 10- - 6-        Patient advised by          [x]MyChart Message  [x]Phone call   []LMOM  []L/M to call office as no active Communication consent on file  []Unable to leave detailed message as VM not approved on Communication consent       Pharmacy advised by    [x]Fax  []Phone call    Approval letter scanned into [employer] Yes

## 2024-11-02 ENCOUNTER — PATIENT MESSAGE (OUTPATIENT)
Dept: FAMILY MEDICINE CLINIC | Facility: CLINIC | Age: 49
End: 2024-11-02

## 2024-11-02 DIAGNOSIS — E66.09 CLASS 1 OBESITY DUE TO EXCESS CALORIES WITH SERIOUS COMORBIDITY AND BODY MASS INDEX (BMI) OF 34.0 TO 34.9 IN ADULT: Primary | ICD-10-CM

## 2024-11-02 DIAGNOSIS — E66.811 CLASS 1 OBESITY DUE TO EXCESS CALORIES WITH SERIOUS COMORBIDITY AND BODY MASS INDEX (BMI) OF 34.0 TO 34.9 IN ADULT: Primary | ICD-10-CM

## 2024-11-04 RX ORDER — TIRZEPATIDE 5 MG/.5ML
5 INJECTION, SOLUTION SUBCUTANEOUS WEEKLY
Qty: 2 ML | Refills: 0 | Status: SHIPPED | OUTPATIENT
Start: 2024-11-04

## 2024-11-13 ENCOUNTER — PATIENT MESSAGE (OUTPATIENT)
Dept: FAMILY MEDICINE CLINIC | Facility: CLINIC | Age: 49
End: 2024-11-13

## 2024-11-13 DIAGNOSIS — H61.91 LESION OF SKIN OF RIGHT EAR: Primary | ICD-10-CM

## 2024-11-18 ENCOUNTER — TRANSCRIBE ORDERS (OUTPATIENT)
Dept: FAMILY MEDICINE CLINIC | Facility: CLINIC | Age: 49
End: 2024-11-18

## 2024-11-18 DIAGNOSIS — H61.91 LESION OF SKIN OF RIGHT EAR: Primary | ICD-10-CM

## 2024-11-18 NOTE — PATIENT COMMUNICATION
I will refer him to [facility] instead. He can see any doctor or PA there.

## 2024-11-25 ENCOUNTER — TELEPHONE (OUTPATIENT)
Dept: UROLOGY | Facility: CLINIC | Age: 49
End: 2024-11-25

## 2024-12-13 ENCOUNTER — PATIENT MESSAGE (OUTPATIENT)
Dept: FAMILY MEDICINE CLINIC | Facility: CLINIC | Age: 49
End: 2024-12-13

## 2024-12-13 DIAGNOSIS — E66.09 CLASS 1 OBESITY DUE TO EXCESS CALORIES WITH SERIOUS COMORBIDITY AND BODY MASS INDEX (BMI) OF 34.0 TO 34.9 IN ADULT: Primary | ICD-10-CM

## 2024-12-13 DIAGNOSIS — E66.811 CLASS 1 OBESITY DUE TO EXCESS CALORIES WITH SERIOUS COMORBIDITY AND BODY MASS INDEX (BMI) OF 34.0 TO 34.9 IN ADULT: Primary | ICD-10-CM

## 2024-12-13 RX ORDER — TIRZEPATIDE 7.5 MG/.5ML
7.5 INJECTION, SOLUTION SUBCUTANEOUS WEEKLY
Qty: 2 ML | Refills: 0 | Status: SHIPPED | OUTPATIENT
Start: 2024-12-13

## 2024-12-27 ENCOUNTER — PATIENT MESSAGE (OUTPATIENT)
Dept: FAMILY MEDICINE CLINIC | Facility: CLINIC | Age: 49
End: 2024-12-27

## 2024-12-27 DIAGNOSIS — L21.9 SEBORRHEIC DERMATITIS: ICD-10-CM

## 2024-12-27 RX ORDER — BENZOCAINE/MENTHOL 6 MG-10 MG
LOZENGE MUCOUS MEMBRANE 2 TIMES DAILY PRN
Qty: 15 G | Refills: 0 | Status: SHIPPED | OUTPATIENT
Start: 2024-12-27

## 2025-01-04 ENCOUNTER — PATIENT MESSAGE (OUTPATIENT)
Dept: FAMILY MEDICINE CLINIC | Facility: CLINIC | Age: 50
End: 2025-01-04

## 2025-01-04 DIAGNOSIS — E66.811 CLASS 1 OBESITY DUE TO EXCESS CALORIES WITH SERIOUS COMORBIDITY AND BODY MASS INDEX (BMI) OF 34.0 TO 34.9 IN ADULT: Primary | ICD-10-CM

## 2025-01-04 DIAGNOSIS — E66.09 CLASS 1 OBESITY DUE TO EXCESS CALORIES WITH SERIOUS COMORBIDITY AND BODY MASS INDEX (BMI) OF 34.0 TO 34.9 IN ADULT: Primary | ICD-10-CM

## 2025-01-06 RX ORDER — TIRZEPATIDE 10 MG/.5ML
10 INJECTION, SOLUTION SUBCUTANEOUS WEEKLY
Qty: 2 ML | Refills: 0 | Status: SHIPPED | OUTPATIENT
Start: 2025-01-06

## 2025-01-28 DIAGNOSIS — G89.29 OTHER CHRONIC PAIN: ICD-10-CM

## 2025-01-28 DIAGNOSIS — M48.02 CERVICAL STENOSIS OF SPINAL CANAL: ICD-10-CM

## 2025-01-28 RX ORDER — GABAPENTIN 300 MG/1
300-600 CAPSULE ORAL
Qty: 180 CAPSULE | Refills: 0 | Status: CANCELLED | OUTPATIENT
Start: 2025-01-28

## 2025-04-11 ENCOUNTER — RA CDI HCC (OUTPATIENT)
Dept: OTHER | Facility: HOSPITAL | Age: 50
End: 2025-04-11

## 2025-04-11 NOTE — PROGRESS NOTES
HCC coding opportunities       Chart reviewed, no opportunity found: CHART REVIEWED, NO OPPORTUNITY FOUND        Patients Insurance        Commercial Insurance: [employer]

## 2025-04-18 ENCOUNTER — OFFICE VISIT (OUTPATIENT)
Dept: FAMILY MEDICINE CLINIC | Facility: CLINIC | Age: 50
End: 2025-04-18
Payer: COMMERCIAL

## 2025-04-18 VITALS
SYSTOLIC BLOOD PRESSURE: 118 MMHG | DIASTOLIC BLOOD PRESSURE: 68 MMHG | TEMPERATURE: 99 F | WEIGHT: 209.3 LBS | OXYGEN SATURATION: 99 % | BODY MASS INDEX: 31 KG/M2 | HEIGHT: 69 IN | HEART RATE: 85 BPM | RESPIRATION RATE: 16 BRPM

## 2025-04-18 DIAGNOSIS — Z86.0100 HISTORY OF COLON POLYPS: Primary | ICD-10-CM

## 2025-04-18 DIAGNOSIS — F41.9 ANXIETY: ICD-10-CM

## 2025-04-18 DIAGNOSIS — Z12.5 SCREENING PSA (PROSTATE SPECIFIC ANTIGEN): ICD-10-CM

## 2025-04-18 DIAGNOSIS — Z79.899 LONG-TERM USE OF HIGH-RISK MEDICATION: ICD-10-CM

## 2025-04-18 DIAGNOSIS — E66.811 CLASS 1 OBESITY DUE TO EXCESS CALORIES WITH SERIOUS COMORBIDITY AND BODY MASS INDEX (BMI) OF 34.0 TO 34.9 IN ADULT: ICD-10-CM

## 2025-04-18 DIAGNOSIS — R73.01 IFG (IMPAIRED FASTING GLUCOSE): ICD-10-CM

## 2025-04-18 DIAGNOSIS — E55.9 VITAMIN D DEFICIENCY: ICD-10-CM

## 2025-04-18 DIAGNOSIS — E66.09 CLASS 1 OBESITY DUE TO EXCESS CALORIES WITH SERIOUS COMORBIDITY AND BODY MASS INDEX (BMI) OF 34.0 TO 34.9 IN ADULT: ICD-10-CM

## 2025-04-18 PROCEDURE — 99214 OFFICE O/P EST MOD 30 MIN: CPT | Performed by: FAMILY MEDICINE

## 2025-04-18 RX ORDER — TIRZEPATIDE 10 MG/.5ML
10 INJECTION, SOLUTION SUBCUTANEOUS WEEKLY
Qty: 2 ML | Refills: 5 | Status: SHIPPED | OUTPATIENT
Start: 2025-04-18

## 2025-04-18 RX ORDER — ESCITALOPRAM OXALATE 10 MG/1
10 TABLET ORAL DAILY
Qty: 90 TABLET | Refills: 2 | Status: SHIPPED | OUTPATIENT
Start: 2025-04-18

## 2025-04-18 NOTE — ASSESSMENT & PLAN NOTE
Update labs   Not taking any supplements   Orders:  •  Lipid panel; Future  •  CBC and differential; Future  •  Comprehensive metabolic panel; Future  •  Ferritin; Future  •  Vitamin D 25 hydroxy; Future  •  Vitamin B12; Future  •  PSA, Total Screen; Future

## 2025-04-18 NOTE — ASSESSMENT & PLAN NOTE
Update labs   Orders:  •  Lipid panel; Future  •  CBC and differential; Future  •  Comprehensive metabolic panel; Future  •  Ferritin; Future  •  Vitamin D 25 hydroxy; Future  •  Vitamin B12; Future  •  PSA, Total Screen; Future

## 2025-04-18 NOTE — ASSESSMENT & PLAN NOTE
Well controlled, continue Lexapro 10 mg   Orders:  •  Lipid panel; Future  •  CBC and differential; Future  •  Comprehensive metabolic panel; Future  •  Ferritin; Future  •  Vitamin D 25 hydroxy; Future  •  Vitamin B12; Future  •  PSA, Total Screen; Future  •  escitalopram (LEXAPRO) 10 mg tablet; Take 1 tablet (10 mg total) by mouth daily

## 2025-04-18 NOTE — PROGRESS NOTES
Assessment & Plan  Anxiety  Well controlled, continue Lexapro 10 mg   Orders:  •  Lipid panel; Future  •  CBC and differential; Future  •  Comprehensive metabolic panel; Future  •  Ferritin; Future  •  Vitamin D 25 hydroxy; Future  •  Vitamin B12; Future  •  PSA, Total Screen; Future  •  escitalopram (LEXAPRO) 10 mg tablet; Take 1 tablet (10 mg total) by mouth daily    IFG (impaired fasting glucose)  Update labs   Orders:  •  Lipid panel; Future  •  CBC and differential; Future  •  Comprehensive metabolic panel; Future  •  Ferritin; Future  •  Vitamin D 25 hydroxy; Future  •  Vitamin B12; Future  •  PSA, Total Screen; Future    Vitamin D deficiency  Update labs   Not taking any supplements   Orders:  •  Lipid panel; Future  •  CBC and differential; Future  •  Comprehensive metabolic panel; Future  •  Ferritin; Future  •  Vitamin D 25 hydroxy; Future  •  Vitamin B12; Future  •  PSA, Total Screen; Future    Screening PSA (prostate specific antigen)  Update labs   Orders:  •  PSA, Total Screen; Future    History of colon polyps  Up to date on colonoscopy, follow up 2030        Long-term use of high-risk medication    Orders:  •  Lipid panel; Future  •  CBC and differential; Future  •  Comprehensive metabolic panel; Future  •  Ferritin; Future  •  Vitamin D 25 hydroxy; Future  •  Vitamin B12; Future  •  PSA, Total Screen; Future    Class 1 obesity due to excess calories with serious comorbidity and body mass index (BMI) of 34.0 to 34.9 in adult  Max weight 255   10/18/25 250   Today 4/18/2025 weight 209, BMI 30   He has lost 16%, 41 pounds  Continue zepbound 10 mg   Work on eating more often, increased protein, weight bearing exercise   Update labs   Orders:  •  tirzepatide (Zepbound) 10 mg/0.5 mL auto-injector; Inject 0.5 mL (10 mg total) under the skin once a week         Return in about 6 months (around 10/18/2025) for Annual physical.    Subjective:   Stanley is a 49 y.o. male here today for a follow-up on his  current medical conditions:    Patient Active Problem List   Diagnosis   • History of kidney stones   • Anxiety   • Cervical spondylosis without myelopathy   • ED (erectile dysfunction)   • Elevated liver enzymes   • History of alcohol abuse   • History of drug abuse (HCC)   • Hypertriglyceridemia   • IFG (impaired fasting glucose)   • Low libido   • Major depressive disorder, recurrent episode, moderate (HCC)   • Class 1 obesity due to excess calories with serious comorbidity and body mass index (BMI) of 30.0 to 30.9 in adult   • Vitamin D deficiency   • Diverticulosis   • Cervical stenosis of spinal canal   • Chronic right shoulder pain   • KELLY (obstructive sleep apnea)   • Psoriasis   • History of colon polyps         Patient Care Team:  Felisa Munoz DO as PCP - General (Family Medicine)    Current Medications:  Current Outpatient Medications   Medication Sig Dispense Refill   • escitalopram (LEXAPRO) 10 mg tablet Take 1 tablet (10 mg total) by mouth daily 90 tablet 2   • gabapentin (NEURONTIN) 300 mg capsule Take 1-2 capsules (300-600 mg total) by mouth daily at bedtime Increase as directed 180 capsule 1   • hydrocortisone 1 % cream Apply topically 2 (two) times a day as needed for irritation or rash No longer than 2 weeks.for itching on face and ears 15 g 0   • tirzepatide (Zepbound) 10 mg/0.5 mL auto-injector Inject 0.5 mL (10 mg total) under the skin once a week 2 mL 5     No current facility-administered medications for this visit.       HPI:  No chief complaint on file.    -- Above per clinical staff and reviewed. --    PHQ-2/9 Depression Screening    Little interest or pleasure in doing things: 0 - not at all  Feeling down, depressed, or hopeless: 0 - not at all  Trouble falling or staying asleep, or sleeping too much: 0 - not at all  Feeling tired or having little energy: 0 - not at all  Poor appetite or overeatin - not at all  Feeling bad about yourself - or that you are a failure or have let  yourself or your family down: 0 - not at all  Trouble concentrating on things, such as reading the newspaper or watching television: 0 - not at all  Moving or speaking so slowly that other people could have noticed. Or the opposite - being so fidgety or restless that you have been moving around a lot more than usual: 0 - not at all  Thoughts that you would be better off dead, or of hurting yourself in some way: 0 - not at all  PHQ-9 Score: 0  PHQ-9 Interpretation: No or Minimal depression       ANJU-7 Flowsheet Screening    Flowsheet Row Most Recent Value   Over the last two weeks, how often have you been bothered by the following problems?     Feeling nervous, anxious, or on edge 0   Not being able to stop or control worrying 0   Worrying too much about different things 0   Trouble relaxing  0   Being so restless that it's hard to sit still 0   Becoming easily annoyed or irritable  0   Feeling afraid as if something awful might happen 0   How difficult have these problems made it for you to do your work, take care of things at home, or get along with other people?  Not difficult at all   ANJU Score  0           PE due  10/10/24    Vit D   Blood work due 24 lipids cmp, vit D   24 PSA   Persistent hematuria  - saw urology 2023 ureteroscopy in .  Prior CT showed nonobstructing stones on the left.  Sleep medicine recommends switch to cymbalta inst  ead of lexapro and neurontin CT urogram non-obstructing stone    Today:  Dog   Ear surgery-   Plastics   Colonoscopy done     Has to work out now   Doing treadmill and weights   Muscle work, push ups     At first was feeling full quick     Max weight 255   10/18/25 250     16%, 41 pounds  Back pain is better    Goal is 200   13       Portions are smaller   Works 3rd shift   Chicken quesidilla, banana, PBJ   Breakfast sandwich or chicken sandwich   Usually eating twice a day   Snacks chips, candy     Has to see dietician through his job   Has a scale  "  Plans mediterranean diet     The following portions of the patient's history were reviewed and updated as appropriate: allergies, current medications, past family history, past medical history, past social history, past surgical history and problem list.    Objective:  Vitals:  /68   Pulse 85   Temp 99 °F (37.2 °C) (Temporal)   Resp 16   Ht 5' 9\" (1.753 m)   Wt 94.9 kg (209 lb 4.8 oz)   SpO2 99%   BMI 30.91 kg/m²    Wt Readings from Last 3 Encounters:   04/18/25 94.9 kg (209 lb 4.8 oz)   10/18/24 114 kg (250 lb 9.6 oz)   10/05/24 107 kg (235 lb)      BP Readings from Last 3 Encounters:   04/18/25 118/68   10/18/24 134/78   10/05/24 135/87        Review of Systems   He has no other concerns. No unexpected weight changes. No chest pain, SOB, or palpitations. No GERD. No changes in bowels or bladder. Sleeping well. No mood changes.     Physical Exam   Constitutional:  he appears well-developed and well-nourished.  HENT: Head: Normocephalic.   Neck: Neck supple.   Cardiovascular: Normal rate, regular rhythm and normal heart sounds.   Pulmonary/Chest: Effort normal and breath sounds normal. No wheezes, rales, or rhonchi.   Abdominal: Soft. Bowel sounds are normal. There is no tenderness. No hepatosplenomegaly.   Musculoskeletal: he exhibits no edema.   Lymphadenopathy: he has no cervical adenopathy.   Neurological: he is alert and oriented to person, place, and time.   Skin: Skin is warm and dry.   Psychiatric: he has a normal mood and affect. his behavior is normal. Thought content normal.        "

## 2025-05-22 DIAGNOSIS — G89.29 OTHER CHRONIC PAIN: ICD-10-CM

## 2025-05-22 DIAGNOSIS — M48.02 CERVICAL STENOSIS OF SPINAL CANAL: ICD-10-CM

## 2025-05-22 RX ORDER — GABAPENTIN 300 MG/1
300-600 CAPSULE ORAL
Qty: 180 CAPSULE | Refills: 1 | Status: SHIPPED | OUTPATIENT
Start: 2025-05-22

## 2025-05-25 DIAGNOSIS — E66.09 CLASS 1 OBESITY DUE TO EXCESS CALORIES WITH SERIOUS COMORBIDITY AND BODY MASS INDEX (BMI) OF 34.0 TO 34.9 IN ADULT: ICD-10-CM

## 2025-05-25 DIAGNOSIS — E66.811 CLASS 1 OBESITY DUE TO EXCESS CALORIES WITH SERIOUS COMORBIDITY AND BODY MASS INDEX (BMI) OF 34.0 TO 34.9 IN ADULT: ICD-10-CM

## 2025-05-27 RX ORDER — TIRZEPATIDE 10 MG/.5ML
10 INJECTION, SOLUTION SUBCUTANEOUS WEEKLY
Qty: 2 ML | Refills: 5 | Status: SHIPPED | OUTPATIENT
Start: 2025-05-27

## 2025-06-27 DIAGNOSIS — E66.811 CLASS 1 OBESITY DUE TO EXCESS CALORIES WITH SERIOUS COMORBIDITY AND BODY MASS INDEX (BMI) OF 34.0 TO 34.9 IN ADULT: ICD-10-CM

## 2025-06-27 DIAGNOSIS — E66.09 CLASS 1 OBESITY DUE TO EXCESS CALORIES WITH SERIOUS COMORBIDITY AND BODY MASS INDEX (BMI) OF 34.0 TO 34.9 IN ADULT: ICD-10-CM

## 2025-06-30 RX ORDER — TIRZEPATIDE 10 MG/.5ML
10 INJECTION, SOLUTION SUBCUTANEOUS WEEKLY
Qty: 2 ML | Refills: 0 | OUTPATIENT
Start: 2025-06-30

## 2025-07-04 ENCOUNTER — PATIENT MESSAGE (OUTPATIENT)
Dept: FAMILY MEDICINE CLINIC | Facility: CLINIC | Age: 50
End: 2025-07-04

## 2025-07-07 ENCOUNTER — TELEPHONE (OUTPATIENT)
Dept: FAMILY MEDICINE CLINIC | Facility: CLINIC | Age: 50
End: 2025-07-07

## 2025-07-07 NOTE — TELEPHONE ENCOUNTER
Fax received from [employer] requesting prior authorization for zepbound 10mg. Patient instructions are Inject 0.5 mL (10 mg total) under the skin once a week     Key [id number]  Please initiate prior authorization.

## 2025-07-07 NOTE — TELEPHONE ENCOUNTER
PA Zepbound 10 MG/0.5ML SUBMITTED    to [employer]     via    []CMM-KEY:    [x]Surescripts-Case ID # [id number]  []Availity-Auth ID #  NDC #    []Faxed to plan   []Other website    []Phone call Case ID #      []PA sent as URGENT    All office notes, labs and other pertaining documents and studies sent. Clinical questions answered. Awaiting determination from insurance company.     Turnaround time for your insurance to make a decision on your Prior Authorization can take 7-21 business days.

## 2025-07-08 NOTE — TELEPHONE ENCOUNTER
Per patient's insurance, starting 7/1/25, [facility] is no longer covering zepbound (for all members taking).    The options are, if pcp and patient agree, she can prescribe wegovy, which is the preferred med and is the easier option.    OR    Pcp can write a letter on medical necessity and it can be faxed to the plan, to try and get the coverage to continue, PA team cannot write the letters. We can fax if you want.     Case ID # [id number]   Fax: [phone]

## 2025-07-08 NOTE — TELEPHONE ENCOUNTER
PA Zepbound 10 MG/0.5ML DENIED    Reason:(Screenshot if applicable)        Message sent to office clinical pool Yes    Denial letter scanned into Media Yes    We can gladly do an appeal but the process can take about 30-60 days to provide determination. Please have the office staff schedule a Peer to Peer at phone [phone]. If an appeal is truly warranted please have Provider send clinical documentation to the PA department to support the appeal.     **Please follow up with your patient regarding denial and next steps**

## 2025-07-09 ENCOUNTER — PATIENT MESSAGE (OUTPATIENT)
Dept: FAMILY MEDICINE CLINIC | Facility: CLINIC | Age: 50
End: 2025-07-09

## 2025-07-21 ENCOUNTER — PATIENT MESSAGE (OUTPATIENT)
Dept: FAMILY MEDICINE CLINIC | Facility: CLINIC | Age: 50
End: 2025-07-21

## 2025-07-21 DIAGNOSIS — E66.09 CLASS 1 OBESITY DUE TO EXCESS CALORIES WITH SERIOUS COMORBIDITY AND BODY MASS INDEX (BMI) OF 34.0 TO 34.9 IN ADULT: Primary | ICD-10-CM

## 2025-07-21 DIAGNOSIS — E66.811 CLASS 1 OBESITY DUE TO EXCESS CALORIES WITH SERIOUS COMORBIDITY AND BODY MASS INDEX (BMI) OF 34.0 TO 34.9 IN ADULT: Primary | ICD-10-CM

## 2025-07-22 ENCOUNTER — TELEPHONE (OUTPATIENT)
Age: 50
End: 2025-07-22

## 2025-07-22 DIAGNOSIS — E66.09 CLASS 1 OBESITY DUE TO EXCESS CALORIES WITH SERIOUS COMORBIDITY AND BODY MASS INDEX (BMI) OF 34.0 TO 34.9 IN ADULT: Primary | ICD-10-CM

## 2025-07-22 DIAGNOSIS — E66.811 CLASS 1 OBESITY DUE TO EXCESS CALORIES WITH SERIOUS COMORBIDITY AND BODY MASS INDEX (BMI) OF 34.0 TO 34.9 IN ADULT: Primary | ICD-10-CM

## 2025-07-22 NOTE — TELEPHONE ENCOUNTER
PA for Semaglutide-Weight Management (WEGOVY) 2.4 MG/0.75ML APPROVED     Date(s) approved 7/22/2025 - 2/22/2026    Case # [id number]       Patient advised by          []MyChart Message  [x]Phone call   []LMOM  []L/M to call office as no active Communication consent on file  []Unable to leave detailed message as VM not approved on Communication consent       Pharmacy advised by    [x]Fax  []Phone call  []Secure Chat    Specialty Pharmacy    []      Approval letter scanned into Media Yes

## 2025-07-22 NOTE — TELEPHONE ENCOUNTER
Reason for call:   [] Prior Auth  [] Other:     Caller:  [x] Patient  [] Pharmacy  Name:   Address:   Callback Number:     Medication: Semaglutide-Weight Management (WEGOVY) 2.4 MG/0.75ML     Dose/Frequency: Inject 0.75 mL (2.4 mg total) under the skin once a week     Quantity: 9 ml    Ordering Provider:   [x] PCP/Provider - Dr. Munoz  [] Speciality/Provider -     Has the patient tried other medications and failed? If failed, which medications did they fail?    [] No   [] Yes -     Is the patient's insurance updated in EPIC?   [x] Yes   [] No     Is a copy of the patient's insurance scanned in EPIC?   [] Yes   [] No

## 2025-07-22 NOTE — TELEPHONE ENCOUNTER
Called patient to advise him that the Wegovy was approved, but patient had some concerns regarding being on the highest dose of the Wegovy vs a lower dose of the Zepbound. Patient has been off of the Zepbound for about 2 weeks. Patient asking for a call back to discuss his clinical questions, please. Thank you.

## 2025-07-22 NOTE — TELEPHONE ENCOUNTER
PA for     Semaglutide-Weight Management (WEGOVY) 2.4 MG/0.75ML   SUBMITTED to [employer]    via    []CMM-KEY:   [x]Surescripts-Case ID #  [id number]   []Availity-Auth ID # NDC #   []Faxed to plan   []Other website   []Phone call Case ID #     [x]PA sent as URGENT    All office notes, labs and other pertaining documents and studies sent. Clinical questions answered. Awaiting determination from insurance company.     Turnaround time for your insurance to make a decision on your Prior Authorization can take 7-21 business days.

## 2025-07-22 NOTE — TELEPHONE ENCOUNTER
If he has been off for 2 weeks then we will need to start the wegovy at the lowest dose. I will send that in. He should contact us in 2 weeks for the next dose.

## 2025-07-23 NOTE — TELEPHONE ENCOUNTER
Patient called states picked up Wegovy 2.4 mg 7- got a 3 month supply did a shot already and would like to know how he is to proceed since he was to take the lower dose. Pharmacy did call for the lower dose saying to soon to fill. Patient states paid $75.00 already for this 3 month supply. Please review, advise and call patient

## (undated) DEVICE — CATH URETERAL 5FR X 70 CM FLEX TIP POLYUR BARD

## (undated) DEVICE — SPECIMEN CONTAINER STERILE PEEL PACK

## (undated) DEVICE — PACK TUR

## (undated) DEVICE — LASER FIBER HOLMIUM 272MICRON

## (undated) DEVICE — Device

## (undated) DEVICE — GUIDEWIRE STRGHT TIP 0.035 IN  SOLO PLUS

## (undated) DEVICE — CATH URET .038 10FR 50CM DUAL LUMEN

## (undated) DEVICE — 3M™ TEGADERM™ TRANSPARENT FILM DRESSING FRAME STYLE, 1624W, 2-3/8 IN X 2-3/4 IN (6 CM X 7 CM), 100/CT 4CT/CASE: Brand: 3M™ TEGADERM™

## (undated) DEVICE — GLOVE SRG BIOGEL 7

## (undated) DEVICE — ENDOSCOPIC VALVE WITH ADAPTER.: Brand: SURSEAL® II

## (undated) DEVICE — SYRINGE 10ML LL